# Patient Record
Sex: FEMALE | Race: WHITE | NOT HISPANIC OR LATINO | Employment: FULL TIME | URBAN - METROPOLITAN AREA
[De-identification: names, ages, dates, MRNs, and addresses within clinical notes are randomized per-mention and may not be internally consistent; named-entity substitution may affect disease eponyms.]

---

## 2017-03-28 ENCOUNTER — COMMUNICATION - HEALTHEAST (OUTPATIENT)
Dept: INTERNAL MEDICINE | Facility: CLINIC | Age: 27
End: 2017-03-28

## 2017-05-17 ENCOUNTER — HOSPITAL ENCOUNTER (OUTPATIENT)
Dept: LAB | Age: 27
Setting detail: SPECIMEN
Discharge: HOME | End: 2017-05-17

## 2017-05-17 ENCOUNTER — OFFICE VISIT - HEALTHEAST (OUTPATIENT)
Dept: FAMILY MEDICINE | Facility: CLINIC | Age: 27
End: 2017-05-17

## 2017-05-17 DIAGNOSIS — R07.0 THROAT PAIN: ICD-10-CM

## 2017-06-02 ENCOUNTER — OFFICE VISIT - HEALTHEAST (OUTPATIENT)
Dept: INTERNAL MEDICINE | Facility: CLINIC | Age: 27
End: 2017-06-02

## 2017-06-02 DIAGNOSIS — E66.811 OBESITY (BMI 30.0-34.9): ICD-10-CM

## 2017-06-02 DIAGNOSIS — Z76.89 ENCOUNTER TO ESTABLISH CARE: ICD-10-CM

## 2017-06-02 DIAGNOSIS — F32.A DEPRESSION: ICD-10-CM

## 2017-06-02 ASSESSMENT — MIFFLIN-ST. JEOR: SCORE: 1590.18

## 2017-07-25 ENCOUNTER — COMMUNICATION - HEALTHEAST (OUTPATIENT)
Dept: INTERNAL MEDICINE | Facility: CLINIC | Age: 27
End: 2017-07-25

## 2017-07-25 ENCOUNTER — OFFICE VISIT - HEALTHEAST (OUTPATIENT)
Dept: INTERNAL MEDICINE | Facility: CLINIC | Age: 27
End: 2017-07-25

## 2017-07-25 DIAGNOSIS — F32.A DEPRESSION: ICD-10-CM

## 2017-07-25 DIAGNOSIS — F41.1 GAD (GENERALIZED ANXIETY DISORDER): ICD-10-CM

## 2017-07-25 DIAGNOSIS — Z09 FOLLOW UP: ICD-10-CM

## 2017-08-29 ENCOUNTER — COMMUNICATION - HEALTHEAST (OUTPATIENT)
Dept: INTERNAL MEDICINE | Facility: CLINIC | Age: 27
End: 2017-08-29

## 2017-08-29 DIAGNOSIS — F32.A DEPRESSION: ICD-10-CM

## 2018-01-23 ENCOUNTER — OFFICE VISIT - HEALTHEAST (OUTPATIENT)
Dept: FAMILY MEDICINE | Facility: CLINIC | Age: 28
End: 2018-01-23

## 2018-01-23 DIAGNOSIS — R51.9 HEADACHE: ICD-10-CM

## 2018-01-23 DIAGNOSIS — N92.6 MENSTRUAL PERIODS IRREGULAR: ICD-10-CM

## 2018-01-23 LAB
HCG UR QL: NEGATIVE
SP GR UR STRIP: 1.02 (ref 1–1.03)

## 2018-03-05 ENCOUNTER — OFFICE VISIT - HEALTHEAST (OUTPATIENT)
Dept: INTERNAL MEDICINE | Facility: CLINIC | Age: 28
End: 2018-03-05

## 2018-03-05 DIAGNOSIS — Z11.3 ROUTINE SCREENING FOR STI (SEXUALLY TRANSMITTED INFECTION): ICD-10-CM

## 2018-03-05 DIAGNOSIS — N89.8 VAGINAL DISCHARGE: ICD-10-CM

## 2018-03-05 LAB
CLUE CELLS: ABNORMAL
TRICHOMONAS, WET PREP: ABNORMAL
YEAST, WET PREP: ABNORMAL

## 2018-03-06 LAB
C TRACH DNA SPEC QL PROBE+SIG AMP: NEGATIVE
N GONORRHOEA DNA SPEC QL NAA+PROBE: NEGATIVE
SYPHILIS RPR SCREEN - HISTORICAL: NORMAL

## 2018-05-17 ENCOUNTER — COMMUNICATION - HEALTHEAST (OUTPATIENT)
Dept: FAMILY MEDICINE | Facility: CLINIC | Age: 28
End: 2018-05-17

## 2018-05-22 ENCOUNTER — COMMUNICATION - HEALTHEAST (OUTPATIENT)
Dept: SCHEDULING | Facility: CLINIC | Age: 28
End: 2018-05-22

## 2018-05-23 ENCOUNTER — OFFICE VISIT - HEALTHEAST (OUTPATIENT)
Dept: INTERNAL MEDICINE | Facility: CLINIC | Age: 28
End: 2018-05-23

## 2018-05-23 DIAGNOSIS — L01.00 IMPETIGO: ICD-10-CM

## 2018-06-07 ENCOUNTER — COMMUNICATION - HEALTHEAST (OUTPATIENT)
Dept: SCHEDULING | Facility: CLINIC | Age: 28
End: 2018-06-07

## 2018-06-07 DIAGNOSIS — R21 FACIAL RASH: ICD-10-CM

## 2018-06-11 ENCOUNTER — OFFICE VISIT - HEALTHEAST (OUTPATIENT)
Dept: INTERNAL MEDICINE | Facility: CLINIC | Age: 28
End: 2018-06-11

## 2018-06-11 DIAGNOSIS — F41.1 GAD (GENERALIZED ANXIETY DISORDER): ICD-10-CM

## 2018-06-11 DIAGNOSIS — Z91.030 HISTORY OF BEE STING ALLERGY: ICD-10-CM

## 2018-06-11 DIAGNOSIS — L01.00 IMPETIGO: ICD-10-CM

## 2018-06-19 ENCOUNTER — COMMUNICATION - HEALTHEAST (OUTPATIENT)
Dept: INTERNAL MEDICINE | Facility: CLINIC | Age: 28
End: 2018-06-19

## 2018-09-04 ENCOUNTER — AMBULATORY - HEALTHEAST (OUTPATIENT)
Dept: INTERNAL MEDICINE | Facility: CLINIC | Age: 28
End: 2018-09-04

## 2018-09-04 ENCOUNTER — COMMUNICATION - HEALTHEAST (OUTPATIENT)
Dept: INTERNAL MEDICINE | Facility: CLINIC | Age: 28
End: 2018-09-04

## 2018-09-04 DIAGNOSIS — F41.1 GAD (GENERALIZED ANXIETY DISORDER): ICD-10-CM

## 2018-09-04 DIAGNOSIS — F32.A DEPRESSION: ICD-10-CM

## 2018-09-11 ENCOUNTER — OFFICE VISIT - HEALTHEAST (OUTPATIENT)
Dept: BEHAVIORAL HEALTH | Facility: HOSPITAL | Age: 28
End: 2018-09-11

## 2018-09-11 DIAGNOSIS — F41.1 GAD (GENERALIZED ANXIETY DISORDER): ICD-10-CM

## 2018-09-11 DIAGNOSIS — F33.1 MAJOR DEPRESSIVE DISORDER, RECURRENT EPISODE, MODERATE (H): ICD-10-CM

## 2018-10-15 ENCOUNTER — OFFICE VISIT - HEALTHEAST (OUTPATIENT)
Dept: BEHAVIORAL HEALTH | Facility: CLINIC | Age: 28
End: 2018-10-15

## 2018-10-15 DIAGNOSIS — F33.41 RECURRENT MAJOR DEPRESSIVE DISORDER, IN PARTIAL REMISSION (H): ICD-10-CM

## 2018-10-15 DIAGNOSIS — F33.41 MAJOR DEPRESSIVE DISORDER, RECURRENT, IN PARTIAL REMISSION (H): ICD-10-CM

## 2018-10-15 DIAGNOSIS — F41.1 GAD (GENERALIZED ANXIETY DISORDER): ICD-10-CM

## 2018-10-15 ASSESSMENT — MIFFLIN-ST. JEOR: SCORE: 1581.11

## 2019-01-03 ENCOUNTER — COMMUNICATION - HEALTHEAST (OUTPATIENT)
Dept: BEHAVIORAL HEALTH | Facility: CLINIC | Age: 29
End: 2019-01-03

## 2019-01-03 DIAGNOSIS — F41.1 GAD (GENERALIZED ANXIETY DISORDER): ICD-10-CM

## 2019-01-03 DIAGNOSIS — F33.41 MAJOR DEPRESSIVE DISORDER, RECURRENT, IN PARTIAL REMISSION (H): ICD-10-CM

## 2019-01-06 ENCOUNTER — COMMUNICATION - HEALTHEAST (OUTPATIENT)
Dept: BEHAVIORAL HEALTH | Facility: CLINIC | Age: 29
End: 2019-01-06

## 2019-01-06 DIAGNOSIS — F41.1 GAD (GENERALIZED ANXIETY DISORDER): ICD-10-CM

## 2019-01-06 DIAGNOSIS — F33.41 RECURRENT MAJOR DEPRESSIVE DISORDER, IN PARTIAL REMISSION (H): ICD-10-CM

## 2019-02-01 ENCOUNTER — COMMUNICATION - HEALTHEAST (OUTPATIENT)
Dept: BEHAVIORAL HEALTH | Facility: CLINIC | Age: 29
End: 2019-02-01

## 2019-03-04 ENCOUNTER — OFFICE VISIT - HEALTHEAST (OUTPATIENT)
Dept: BEHAVIORAL HEALTH | Facility: CLINIC | Age: 29
End: 2019-03-04

## 2019-03-04 DIAGNOSIS — F41.1 GAD (GENERALIZED ANXIETY DISORDER): ICD-10-CM

## 2019-03-04 DIAGNOSIS — F33.41 MAJOR DEPRESSIVE DISORDER, RECURRENT, IN PARTIAL REMISSION (H): ICD-10-CM

## 2019-03-04 ASSESSMENT — MIFFLIN-ST. JEOR: SCORE: 1553.89

## 2019-03-05 ENCOUNTER — RECORDS - HEALTHEAST (OUTPATIENT)
Dept: ADMINISTRATIVE | Facility: OTHER | Age: 29
End: 2019-03-05

## 2019-03-05 LAB
HPV_EXT - HISTORICAL: ABNORMAL
PAP SMEAR - HIM PATIENT REPORTED: ABNORMAL

## 2019-03-11 ENCOUNTER — COMMUNICATION - HEALTHEAST (OUTPATIENT)
Dept: BEHAVIORAL HEALTH | Facility: CLINIC | Age: 29
End: 2019-03-11

## 2019-03-11 DIAGNOSIS — F33.41 MAJOR DEPRESSIVE DISORDER, RECURRENT, IN PARTIAL REMISSION (H): ICD-10-CM

## 2019-03-19 ENCOUNTER — OFFICE VISIT - HEALTHEAST (OUTPATIENT)
Dept: FAMILY MEDICINE | Facility: CLINIC | Age: 29
End: 2019-03-19

## 2019-03-19 ENCOUNTER — COMMUNICATION - HEALTHEAST (OUTPATIENT)
Dept: SCHEDULING | Facility: CLINIC | Age: 29
End: 2019-03-19

## 2019-03-19 DIAGNOSIS — T78.40XA ALLERGIC REACTION, INITIAL ENCOUNTER: ICD-10-CM

## 2019-03-20 ENCOUNTER — COMMUNICATION - HEALTHEAST (OUTPATIENT)
Dept: BEHAVIORAL HEALTH | Facility: CLINIC | Age: 29
End: 2019-03-20

## 2019-03-20 DIAGNOSIS — F33.41 MAJOR DEPRESSIVE DISORDER, RECURRENT, IN PARTIAL REMISSION (H): ICD-10-CM

## 2019-03-23 ENCOUNTER — COMMUNICATION - HEALTHEAST (OUTPATIENT)
Dept: SCHEDULING | Facility: CLINIC | Age: 29
End: 2019-03-23

## 2019-04-29 ENCOUNTER — OFFICE VISIT - HEALTHEAST (OUTPATIENT)
Dept: BEHAVIORAL HEALTH | Facility: CLINIC | Age: 29
End: 2019-04-29

## 2019-04-29 ENCOUNTER — RECORDS - HEALTHEAST (OUTPATIENT)
Dept: ADMINISTRATIVE | Facility: OTHER | Age: 29
End: 2019-04-29

## 2019-04-29 DIAGNOSIS — F41.1 GAD (GENERALIZED ANXIETY DISORDER): ICD-10-CM

## 2019-04-29 DIAGNOSIS — F33.41 MAJOR DEPRESSIVE DISORDER, RECURRENT, IN PARTIAL REMISSION (H): ICD-10-CM

## 2019-04-29 ASSESSMENT — MIFFLIN-ST. JEOR: SCORE: 1544.82

## 2019-04-30 ENCOUNTER — AMBULATORY - HEALTHEAST (OUTPATIENT)
Dept: MIDWIFE SERVICES | Facility: CLINIC | Age: 29
End: 2019-04-30

## 2019-04-30 ENCOUNTER — COMMUNICATION - HEALTHEAST (OUTPATIENT)
Dept: ADMINISTRATIVE | Facility: CLINIC | Age: 29
End: 2019-04-30

## 2019-04-30 DIAGNOSIS — Z01.812 PRE-PROCEDURE LAB EXAM: ICD-10-CM

## 2019-05-10 ENCOUNTER — RECORDS - HEALTHEAST (OUTPATIENT)
Dept: HEALTH INFORMATION MANAGEMENT | Facility: CLINIC | Age: 29
End: 2019-05-10

## 2019-05-14 ENCOUNTER — AMBULATORY - HEALTHEAST (OUTPATIENT)
Dept: OBGYN | Facility: CLINIC | Age: 29
End: 2019-05-14

## 2019-05-14 ENCOUNTER — AMBULATORY - HEALTHEAST (OUTPATIENT)
Dept: LAB | Facility: CLINIC | Age: 29
End: 2019-05-14

## 2019-05-14 DIAGNOSIS — R87.610 ASCUS WITH POSITIVE HIGH RISK HPV CERVICAL: ICD-10-CM

## 2019-05-14 DIAGNOSIS — Z01.812 PRE-PROCEDURE LAB EXAM: ICD-10-CM

## 2019-05-14 DIAGNOSIS — R87.810 ASCUS WITH POSITIVE HIGH RISK HPV CERVICAL: ICD-10-CM

## 2019-05-14 LAB — HCG UR QL: NEGATIVE

## 2019-05-14 ASSESSMENT — MIFFLIN-ST. JEOR: SCORE: 1562.96

## 2019-05-28 ENCOUNTER — RECORDS - HEALTHEAST (OUTPATIENT)
Dept: ADMINISTRATIVE | Facility: OTHER | Age: 29
End: 2019-05-28

## 2019-06-27 ENCOUNTER — RECORDS - HEALTHEAST (OUTPATIENT)
Dept: ADMINISTRATIVE | Facility: OTHER | Age: 29
End: 2019-06-27

## 2019-07-08 ENCOUNTER — OFFICE VISIT - HEALTHEAST (OUTPATIENT)
Dept: BEHAVIORAL HEALTH | Facility: CLINIC | Age: 29
End: 2019-07-08

## 2019-07-08 DIAGNOSIS — F33.41 MAJOR DEPRESSIVE DISORDER, RECURRENT, IN PARTIAL REMISSION (H): ICD-10-CM

## 2019-07-08 DIAGNOSIS — F41.1 GAD (GENERALIZED ANXIETY DISORDER): ICD-10-CM

## 2019-07-08 ASSESSMENT — MIFFLIN-ST. JEOR: SCORE: 1585.64

## 2019-08-05 ENCOUNTER — RECORDS - HEALTHEAST (OUTPATIENT)
Dept: ADMINISTRATIVE | Facility: OTHER | Age: 29
End: 2019-08-05

## 2019-09-16 ENCOUNTER — RECORDS - HEALTHEAST (OUTPATIENT)
Dept: ADMINISTRATIVE | Facility: OTHER | Age: 29
End: 2019-09-16

## 2019-10-11 ENCOUNTER — COMMUNICATION - HEALTHEAST (OUTPATIENT)
Dept: BEHAVIORAL HEALTH | Facility: CLINIC | Age: 29
End: 2019-10-11

## 2019-10-14 ENCOUNTER — COMMUNICATION - HEALTHEAST (OUTPATIENT)
Dept: BEHAVIORAL HEALTH | Facility: CLINIC | Age: 29
End: 2019-10-14

## 2019-11-25 ENCOUNTER — OFFICE VISIT - HEALTHEAST (OUTPATIENT)
Dept: BEHAVIORAL HEALTH | Facility: CLINIC | Age: 29
End: 2019-11-25

## 2019-11-25 DIAGNOSIS — F33.41 MAJOR DEPRESSIVE DISORDER, RECURRENT, IN PARTIAL REMISSION (H): ICD-10-CM

## 2019-11-25 DIAGNOSIS — F41.1 GAD (GENERALIZED ANXIETY DISORDER): ICD-10-CM

## 2019-11-25 ASSESSMENT — ANXIETY QUESTIONNAIRES
5. BEING SO RESTLESS THAT IT IS HARD TO SIT STILL: SEVERAL DAYS
6. BECOMING EASILY ANNOYED OR IRRITABLE: SEVERAL DAYS
4. TROUBLE RELAXING: SEVERAL DAYS
7. FEELING AFRAID AS IF SOMETHING AWFUL MIGHT HAPPEN: SEVERAL DAYS
2. NOT BEING ABLE TO STOP OR CONTROL WORRYING: SEVERAL DAYS
1. FEELING NERVOUS, ANXIOUS, OR ON EDGE: MORE THAN HALF THE DAYS
3. WORRYING TOO MUCH ABOUT DIFFERENT THINGS: MORE THAN HALF THE DAYS
GAD7 TOTAL SCORE: 9

## 2019-11-25 ASSESSMENT — MIFFLIN-ST. JEOR: SCORE: 1672.97

## 2019-11-25 ASSESSMENT — PATIENT HEALTH QUESTIONNAIRE - PHQ9: SUM OF ALL RESPONSES TO PHQ QUESTIONS 1-9: 9

## 2019-12-09 ENCOUNTER — OFFICE VISIT - HEALTHEAST (OUTPATIENT)
Dept: MIDWIFE SERVICES | Facility: CLINIC | Age: 29
End: 2019-12-09

## 2019-12-09 ENCOUNTER — COMMUNICATION - HEALTHEAST (OUTPATIENT)
Dept: MIDWIFE SERVICES | Facility: CLINIC | Age: 29
End: 2019-12-09

## 2019-12-09 DIAGNOSIS — R39.15 URINARY URGENCY: ICD-10-CM

## 2019-12-09 DIAGNOSIS — N90.89 VULVAR LESION: ICD-10-CM

## 2019-12-09 LAB
ALBUMIN UR-MCNC: NEGATIVE MG/DL
APPEARANCE UR: CLEAR
BACTERIA #/AREA URNS HPF: ABNORMAL HPF
BILIRUB UR QL STRIP: NEGATIVE
CLUE CELLS: NORMAL
COLOR UR AUTO: YELLOW
GLUCOSE UR STRIP-MCNC: NEGATIVE MG/DL
HGB UR QL STRIP: ABNORMAL
KETONES UR STRIP-MCNC: ABNORMAL MG/DL
LEUKOCYTE ESTERASE UR QL STRIP: ABNORMAL
MUCOUS THREADS #/AREA URNS LPF: ABNORMAL LPF
NITRATE UR QL: NEGATIVE
PH UR STRIP: 6 [PH] (ref 5–8)
RBC #/AREA URNS AUTO: ABNORMAL HPF
SP GR UR STRIP: 1.02 (ref 1–1.03)
SQUAMOUS #/AREA URNS AUTO: ABNORMAL LPF
TRICHOMONAS, WET PREP: NORMAL
UROBILINOGEN UR STRIP-ACNC: ABNORMAL
WBC #/AREA URNS AUTO: ABNORMAL HPF
YEAST, WET PREP: NORMAL

## 2019-12-09 ASSESSMENT — MIFFLIN-ST. JEOR: SCORE: 1686.58

## 2019-12-10 ENCOUNTER — AMBULATORY - HEALTHEAST (OUTPATIENT)
Dept: MIDWIFE SERVICES | Facility: CLINIC | Age: 29
End: 2019-12-10

## 2019-12-10 DIAGNOSIS — A56.09 CHLAMYDIA TRACHOMATIS INFECTION OF LOWER GENITOURINARY SITES: ICD-10-CM

## 2019-12-10 LAB
C TRACH DNA SPEC QL PROBE+SIG AMP: POSITIVE
HSV SPECIMEN: NORMAL
HSV1 DNA SPEC QL NAA+PROBE: NEGATIVE
HSV2 DNA SPEC QL NAA+PROBE: NEGATIVE
N GONORRHOEA DNA SPEC QL NAA+PROBE: NEGATIVE

## 2019-12-11 LAB — BACTERIA SPEC CULT: NORMAL

## 2019-12-22 ENCOUNTER — COMMUNICATION - HEALTHEAST (OUTPATIENT)
Dept: LAB | Facility: CLINIC | Age: 29
End: 2019-12-22

## 2019-12-26 ENCOUNTER — COMMUNICATION - HEALTHEAST (OUTPATIENT)
Dept: LAB | Facility: CLINIC | Age: 29
End: 2019-12-26

## 2019-12-27 ENCOUNTER — AMBULATORY - HEALTHEAST (OUTPATIENT)
Dept: MIDWIFE SERVICES | Facility: CLINIC | Age: 29
End: 2019-12-27

## 2019-12-27 ENCOUNTER — AMBULATORY - HEALTHEAST (OUTPATIENT)
Dept: LAB | Facility: CLINIC | Age: 29
End: 2019-12-27

## 2019-12-27 DIAGNOSIS — A56.09 CHLAMYDIA TRACHOMATIS INFECTION OF LOWER GENITOURINARY SITES: ICD-10-CM

## 2019-12-30 LAB
C TRACH DNA SPEC QL PROBE+SIG AMP: NEGATIVE
N GONORRHOEA DNA SPEC QL NAA+PROBE: NEGATIVE

## 2020-01-14 ENCOUNTER — RECORDS - HEALTHEAST (OUTPATIENT)
Dept: ADMINISTRATIVE | Facility: OTHER | Age: 30
End: 2020-01-14

## 2020-01-20 ENCOUNTER — OFFICE VISIT - HEALTHEAST (OUTPATIENT)
Dept: FAMILY MEDICINE | Facility: CLINIC | Age: 30
End: 2020-01-20

## 2020-01-20 DIAGNOSIS — Z20.2 POSSIBLE EXPOSURE TO STD: ICD-10-CM

## 2020-01-20 DIAGNOSIS — R07.0 THROAT PAIN: ICD-10-CM

## 2020-01-20 LAB — DEPRECATED S PYO AG THROAT QL EIA: NORMAL

## 2020-01-20 ASSESSMENT — MIFFLIN-ST. JEOR: SCORE: 1659.93

## 2020-01-21 ENCOUNTER — COMMUNICATION - HEALTHEAST (OUTPATIENT)
Dept: FAMILY MEDICINE | Facility: CLINIC | Age: 30
End: 2020-01-21

## 2020-01-21 DIAGNOSIS — J02.0 PHARYNGITIS DUE TO STREPTOCOCCUS SPECIES: ICD-10-CM

## 2020-01-21 LAB
BACTERIA SPEC CULT: ABNORMAL
BACTERIA SPEC CULT: ABNORMAL
C TRACH DNA SPEC QL NAA+PROBE: NEGATIVE
SPECIMEN DESCRIPTION: NORMAL

## 2020-01-23 ENCOUNTER — RECORDS - HEALTHEAST (OUTPATIENT)
Dept: ADMINISTRATIVE | Facility: OTHER | Age: 30
End: 2020-01-23

## 2020-01-23 LAB — BACTERIA SPEC CULT: NORMAL

## 2020-03-16 ENCOUNTER — OFFICE VISIT - HEALTHEAST (OUTPATIENT)
Dept: BEHAVIORAL HEALTH | Facility: CLINIC | Age: 30
End: 2020-03-16

## 2020-03-16 DIAGNOSIS — F41.1 GAD (GENERALIZED ANXIETY DISORDER): ICD-10-CM

## 2020-03-16 DIAGNOSIS — F33.41 MAJOR DEPRESSIVE DISORDER, RECURRENT, IN PARTIAL REMISSION (H): ICD-10-CM

## 2020-03-16 ASSESSMENT — ANXIETY QUESTIONNAIRES
3. WORRYING TOO MUCH ABOUT DIFFERENT THINGS: NEARLY EVERY DAY
2. NOT BEING ABLE TO STOP OR CONTROL WORRYING: NEARLY EVERY DAY
7. FEELING AFRAID AS IF SOMETHING AWFUL MIGHT HAPPEN: NEARLY EVERY DAY
5. BEING SO RESTLESS THAT IT IS HARD TO SIT STILL: MORE THAN HALF THE DAYS
4. TROUBLE RELAXING: MORE THAN HALF THE DAYS
1. FEELING NERVOUS, ANXIOUS, OR ON EDGE: NEARLY EVERY DAY
6. BECOMING EASILY ANNOYED OR IRRITABLE: NEARLY EVERY DAY
GAD7 TOTAL SCORE: 19

## 2020-03-16 ASSESSMENT — PATIENT HEALTH QUESTIONNAIRE - PHQ9: SUM OF ALL RESPONSES TO PHQ QUESTIONS 1-9: 10

## 2020-03-16 ASSESSMENT — MIFFLIN-ST. JEOR: SCORE: 1682.04

## 2020-04-27 ENCOUNTER — COMMUNICATION - HEALTHEAST (OUTPATIENT)
Dept: INTERNAL MEDICINE | Facility: CLINIC | Age: 30
End: 2020-04-27

## 2020-04-27 DIAGNOSIS — Z91.030 HISTORY OF BEE STING ALLERGY: ICD-10-CM

## 2020-07-16 ENCOUNTER — OFFICE VISIT - HEALTHEAST (OUTPATIENT)
Dept: INTERNAL MEDICINE | Facility: CLINIC | Age: 30
End: 2020-07-16

## 2020-07-16 DIAGNOSIS — R19.7 DIARRHEA, UNSPECIFIED TYPE: ICD-10-CM

## 2020-07-16 LAB
BASOPHILS # BLD AUTO: 0.1 THOU/UL (ref 0–0.2)
BASOPHILS NFR BLD AUTO: 1 % (ref 0–2)
C REACTIVE PROTEIN LHE: 1.4 MG/DL (ref 0–0.8)
EOSINOPHIL # BLD AUTO: 0.1 THOU/UL (ref 0–0.4)
EOSINOPHIL NFR BLD AUTO: 2 % (ref 0–6)
ERYTHROCYTE [DISTWIDTH] IN BLOOD BY AUTOMATED COUNT: 13 % (ref 11–14.5)
ERYTHROCYTE [SEDIMENTATION RATE] IN BLOOD BY WESTERGREN METHOD: 12 MM/HR (ref 0–20)
HCT VFR BLD AUTO: 38.9 % (ref 35–47)
HGB BLD-MCNC: 12.9 G/DL (ref 12–16)
LYMPHOCYTES # BLD AUTO: 2.1 THOU/UL (ref 0.8–4.4)
LYMPHOCYTES NFR BLD AUTO: 33 % (ref 20–40)
MCH RBC QN AUTO: 28.9 PG (ref 27–34)
MCHC RBC AUTO-ENTMCNC: 33.2 G/DL (ref 32–36)
MCV RBC AUTO: 87 FL (ref 80–100)
MONOCYTES # BLD AUTO: 0.4 THOU/UL (ref 0–0.9)
MONOCYTES NFR BLD AUTO: 6 % (ref 2–10)
NEUTROPHILS # BLD AUTO: 3.8 THOU/UL (ref 2–7.7)
NEUTROPHILS NFR BLD AUTO: 58 % (ref 50–70)
PLATELET # BLD AUTO: 324 THOU/UL (ref 140–440)
PMV BLD AUTO: 11.3 FL (ref 8.5–12.5)
RBC # BLD AUTO: 4.47 MILL/UL (ref 3.8–5.4)
TSH SERPL DL<=0.005 MIU/L-ACNC: 1.1 UIU/ML (ref 0.3–5)
WBC: 6.5 THOU/UL (ref 4–11)

## 2020-07-16 ASSESSMENT — MIFFLIN-ST. JEOR: SCORE: 1682.04

## 2020-07-21 ENCOUNTER — AMBULATORY - HEALTHEAST (OUTPATIENT)
Dept: INTERNAL MEDICINE | Facility: CLINIC | Age: 30
End: 2020-07-21

## 2020-07-21 ENCOUNTER — COMMUNICATION - HEALTHEAST (OUTPATIENT)
Dept: INTERNAL MEDICINE | Facility: CLINIC | Age: 30
End: 2020-07-21

## 2020-07-21 DIAGNOSIS — R19.7 DIARRHEA, UNSPECIFIED TYPE: ICD-10-CM

## 2020-07-21 LAB
GLIADIN IGA SER-ACNC: 1.1 U/ML
GLIADIN IGG SER-ACNC: <0.4 U/ML
IGA SERPL-MCNC: 191 MG/DL (ref 65–400)
TTG IGA SER-ACNC: 0.4 U/ML
TTG IGG SER-ACNC: <0.6 U/ML

## 2020-08-03 ENCOUNTER — RECORDS - HEALTHEAST (OUTPATIENT)
Dept: ADMINISTRATIVE | Facility: OTHER | Age: 30
End: 2020-08-03

## 2020-08-17 ENCOUNTER — COMMUNICATION - HEALTHEAST (OUTPATIENT)
Dept: ADMINISTRATIVE | Facility: CLINIC | Age: 30
End: 2020-08-17

## 2020-08-17 DIAGNOSIS — Z01.812 PRE-PROCEDURE LAB EXAM: ICD-10-CM

## 2020-08-18 ENCOUNTER — COMMUNICATION - HEALTHEAST (OUTPATIENT)
Dept: ADMINISTRATIVE | Facility: CLINIC | Age: 30
End: 2020-08-18

## 2020-08-25 ENCOUNTER — AMBULATORY - HEALTHEAST (OUTPATIENT)
Dept: OBGYN | Facility: CLINIC | Age: 30
End: 2020-08-25

## 2020-08-25 ENCOUNTER — AMBULATORY - HEALTHEAST (OUTPATIENT)
Dept: LAB | Facility: CLINIC | Age: 30
End: 2020-08-25

## 2020-08-25 DIAGNOSIS — N90.89 VULVAR SKIN TAG: ICD-10-CM

## 2020-08-25 DIAGNOSIS — Z01.812 PRE-PROCEDURE LAB EXAM: ICD-10-CM

## 2020-08-25 DIAGNOSIS — Z11.3 ROUTINE SCREENING FOR STI (SEXUALLY TRANSMITTED INFECTION): ICD-10-CM

## 2020-08-25 DIAGNOSIS — R87.610 ASCUS OF CERVIX WITH NEGATIVE HIGH RISK HPV: ICD-10-CM

## 2020-08-25 LAB — HCG UR QL: NEGATIVE

## 2020-08-26 LAB
C TRACH DNA SPEC QL PROBE+SIG AMP: NEGATIVE
N GONORRHOEA DNA SPEC QL NAA+PROBE: NEGATIVE

## 2020-08-27 ENCOUNTER — OFFICE VISIT - HEALTHEAST (OUTPATIENT)
Dept: INTERNAL MEDICINE | Facility: CLINIC | Age: 30
End: 2020-08-27

## 2020-09-11 ENCOUNTER — COMMUNICATION - HEALTHEAST (OUTPATIENT)
Dept: BEHAVIORAL HEALTH | Facility: CLINIC | Age: 30
End: 2020-09-11

## 2020-09-17 ENCOUNTER — COMMUNICATION - HEALTHEAST (OUTPATIENT)
Dept: INTERNAL MEDICINE | Facility: CLINIC | Age: 30
End: 2020-09-17

## 2020-09-17 DIAGNOSIS — B37.31 YEAST VAGINITIS: ICD-10-CM

## 2020-09-22 ENCOUNTER — AMBULATORY - HEALTHEAST (OUTPATIENT)
Dept: LAB | Facility: CLINIC | Age: 30
End: 2020-09-22

## 2020-09-22 DIAGNOSIS — R19.7 DIARRHEA, UNSPECIFIED TYPE: ICD-10-CM

## 2020-09-23 LAB — CALPROTECTIN STL-MCNT: 9.6 MG/KG (ref 0–49.9)

## 2020-09-28 ENCOUNTER — OFFICE VISIT - HEALTHEAST (OUTPATIENT)
Dept: INTERNAL MEDICINE | Facility: CLINIC | Age: 30
End: 2020-09-28

## 2020-10-14 ENCOUNTER — COMMUNICATION - HEALTHEAST (OUTPATIENT)
Dept: INTERNAL MEDICINE | Facility: CLINIC | Age: 30
End: 2020-10-14

## 2020-10-14 DIAGNOSIS — Z30.8 ENCOUNTER FOR OTHER CONTRACEPTIVE MANAGEMENT: ICD-10-CM

## 2020-12-22 ENCOUNTER — OFFICE VISIT - HEALTHEAST (OUTPATIENT)
Dept: FAMILY MEDICINE | Facility: CLINIC | Age: 30
End: 2020-12-22

## 2020-12-22 ENCOUNTER — AMBULATORY - HEALTHEAST (OUTPATIENT)
Dept: FAMILY MEDICINE | Facility: CLINIC | Age: 30
End: 2020-12-22

## 2020-12-22 DIAGNOSIS — R05.9 COUGH: ICD-10-CM

## 2020-12-22 DIAGNOSIS — Z13.9 SCREENING FOR CONDITION: ICD-10-CM

## 2020-12-22 DIAGNOSIS — Z20.822 SUSPECTED COVID-19 VIRUS INFECTION: ICD-10-CM

## 2020-12-23 ENCOUNTER — COMMUNICATION - HEALTHEAST (OUTPATIENT)
Dept: SCHEDULING | Facility: CLINIC | Age: 30
End: 2020-12-23

## 2020-12-23 LAB
SARS-COV-2 PCR COMMENT: NORMAL
SARS-COV-2 RNA SPEC QL NAA+PROBE: NEGATIVE
SARS-COV-2 VIRUS SPECIMEN SOURCE: NORMAL

## 2021-02-26 ENCOUNTER — COMMUNICATION - HEALTHEAST (OUTPATIENT)
Dept: INTERNAL MEDICINE | Facility: CLINIC | Age: 31
End: 2021-02-26

## 2021-02-26 DIAGNOSIS — F41.1 GAD (GENERALIZED ANXIETY DISORDER): ICD-10-CM

## 2021-02-26 DIAGNOSIS — F33.41 MAJOR DEPRESSIVE DISORDER, RECURRENT, IN PARTIAL REMISSION (H): ICD-10-CM

## 2021-03-03 ENCOUNTER — COMMUNICATION - HEALTHEAST (OUTPATIENT)
Dept: BEHAVIORAL HEALTH | Facility: CLINIC | Age: 31
End: 2021-03-03

## 2021-03-10 ENCOUNTER — OFFICE VISIT - HEALTHEAST (OUTPATIENT)
Dept: INTERNAL MEDICINE | Facility: CLINIC | Age: 31
End: 2021-03-10

## 2021-03-10 DIAGNOSIS — G43.109 MIGRAINE WITH AURA AND WITHOUT STATUS MIGRAINOSUS, NOT INTRACTABLE: ICD-10-CM

## 2021-03-10 DIAGNOSIS — D22.9 CHANGING NEVUS: ICD-10-CM

## 2021-03-10 NOTE — ASSESSMENT & PLAN NOTE
- START: Imitrex as needed  - Counseled that an estrogen-free contraception option is preferred in the setting of migraine with aura. She will consider options and send a Simple message   - Follow up annually if migraine frequency remains the same as it is currently

## 2021-03-29 ENCOUNTER — OFFICE VISIT - HEALTHEAST (OUTPATIENT)
Dept: INTERNAL MEDICINE | Facility: CLINIC | Age: 31
End: 2021-03-29

## 2021-03-29 DIAGNOSIS — Z91.030 HISTORY OF BEE STING ALLERGY: ICD-10-CM

## 2021-03-29 DIAGNOSIS — Z30.430 ENCOUNTER FOR INSERTION OF INTRAUTERINE CONTRACEPTIVE DEVICE: ICD-10-CM

## 2021-03-29 LAB — HCG UR QL: NEGATIVE

## 2021-05-13 ENCOUNTER — OFFICE VISIT - HEALTHEAST (OUTPATIENT)
Dept: INTERNAL MEDICINE | Facility: CLINIC | Age: 31
End: 2021-05-13

## 2021-05-13 DIAGNOSIS — Z11.3 ROUTINE SCREENING FOR STI (SEXUALLY TRANSMITTED INFECTION): ICD-10-CM

## 2021-05-13 DIAGNOSIS — F33.41 MAJOR DEPRESSIVE DISORDER, RECURRENT, IN PARTIAL REMISSION (H): ICD-10-CM

## 2021-05-13 DIAGNOSIS — Z30.431 IUD CHECK UP: ICD-10-CM

## 2021-05-13 DIAGNOSIS — Z12.4 CERVICAL CANCER SCREENING: ICD-10-CM

## 2021-05-13 NOTE — ASSESSMENT & PLAN NOTE
Reviewed options including referral to bariatric clinic, [employer], Noom application, visit to dietician. She will look into the[facility]etician at [facility] where her insurance indicated coverage  - START: wellbutrin 150 mg two times a day and naltrexone 50 mg daily  - Follow up in 2 months for weight check, LFTs

## 2021-05-13 NOTE — ASSESSMENT & PLAN NOTE
Doing well from a mood standpoint  Bupropion may have some benefit for mood as well as weight loss, will repeat PHQ9 at next visit

## 2021-05-14 LAB
C TRACH DNA SPEC QL PROBE+SIG AMP: NEGATIVE
HPV SOURCE: NORMAL
HUMAN PAPILLOMA VIRUS 16 DNA: NEGATIVE
HUMAN PAPILLOMA VIRUS 18 DNA: NEGATIVE
HUMAN PAPILLOMA VIRUS FINAL DIAGNOSIS: NORMAL
HUMAN PAPILLOMA VIRUS OTHER HR: NEGATIVE
N GONORRHOEA DNA SPEC QL NAA+PROBE: NEGATIVE
SPECIMEN DESCRIPTION: NORMAL

## 2021-05-17 ENCOUNTER — COMMUNICATION - HEALTHEAST (OUTPATIENT)
Dept: INTERNAL MEDICINE | Facility: CLINIC | Age: 31
End: 2021-05-17

## 2021-05-17 DIAGNOSIS — Z91.030 HISTORY OF BEE STING ALLERGY: ICD-10-CM

## 2021-05-20 ENCOUNTER — COMMUNICATION - HEALTHEAST (OUTPATIENT)
Dept: OBGYN | Facility: CLINIC | Age: 31
End: 2021-05-20

## 2021-05-26 ASSESSMENT — PATIENT HEALTH QUESTIONNAIRE - PHQ9: SUM OF ALL RESPONSES TO PHQ QUESTIONS 1-9: 9

## 2021-05-26 NOTE — TELEPHONE ENCOUNTER
Call from pt       CC: nausea for past few days and episode x 1 of emesis today           Recently started on oral steroids for allergic type reaction     Wed / Thurs - asx   Fri - started to feel a bit nauseated off and on - would sometimes feel better after eating   Sat/ today - some nausea - did vomit x 1 within the past hr - looks like what she ate today - no bile - not black or bloody      Sx that she was taking the steriods for has resolved - was better right after she left the clinic and those sx have not returned     Not sound particularly ill on the phone to me       > No fever   > No diarrhea   > Not pregnant   > No belly pain   > No HA   > Stopped taking steroids after dose this afternoon   > Appetite low yesterday and today     A/P:   > OK for clear fluids tonight   > Advance to BRAT diet tomorrow as able   > CB if not better / worse / change / new sx and can reassess       Sukumar Ibarra, RN   Triage and Medication Refills        Reason for Disposition    Vomiting a prescription medication    Protocols used: VOMITING-A-AH

## 2021-05-27 VITALS
BODY MASS INDEX: 32.78 KG/M2 | DIASTOLIC BLOOD PRESSURE: 64 MMHG | HEART RATE: 70 BPM | SYSTOLIC BLOOD PRESSURE: 112 MMHG | WEIGHT: 200 LBS

## 2021-05-27 ASSESSMENT — PATIENT HEALTH QUESTIONNAIRE - PHQ9: SUM OF ALL RESPONSES TO PHQ QUESTIONS 1-9: 10

## 2021-05-28 ASSESSMENT — ANXIETY QUESTIONNAIRES
GAD7 TOTAL SCORE: 19
GAD7 TOTAL SCORE: 9

## 2021-05-28 NOTE — PATIENT INSTRUCTIONS - HE
Please contact crisis or go to the emergency room if you have thoughts of self harm or suicide.  Take medication as prescribed. Please do not make changes or adjustments to your medications without talking to a health professional first.  Contact the pharmacy if you are out of medication and in need of a refill.

## 2021-05-28 NOTE — PROGRESS NOTES
Patient here today for follow up of medication management. States depression 2.5/5 denies SI/HI, anxiety 3-4/5. States sleeps is okay, trouble staying asleep  PHQ-11  SHERLY-13    Nothing reported on [place]

## 2021-05-28 NOTE — PROGRESS NOTES
"Colposcopy Procedure Note    Indications: Pap smear on 3/5/19 showed ASCUS with positive HRHPV.  Pertinent past history includes an abnormal Pap about 10 years ago, but she didn't have to have a colposcopy.    Procedure Details   /70   Pulse 80   Ht 5' 5.5\" (1.664 m)   Wt 185 lb (83.9 kg)   LMP 04/25/2019   Body mass index is 30.32 kg/m .    The reason for procedure is explained, and informed consent is obtained.  Urine hCG is negative.    Speculum is placed in the vagina and visualization of cervix is achieved. The cervix is swabbed with 3% acetic acid solution. Transformation zone is easily seen.  Green filter is applied with no abnormal vessels seen.  Acetowhite epithelium is not seen.  ECC is not done.  Adequate colposcopy.  The patient tolerated the procedure well.    Impression: normal cervix    Plan: Post procedure instructions are reviewed.  The role of HPV in causing cervical pap smear abnormalities, dysplasia, and cancer is reviewed with the patient.  We discussed the role of the immune system and ways to keep it strong.  She was counseled to return to her primary for a Pap with HPV testing as recommended in a year.    "

## 2021-05-30 VITALS — WEIGHT: 188.9 LBS | BODY MASS INDEX: 30.96 KG/M2

## 2021-05-30 VITALS — HEIGHT: 66 IN | BODY MASS INDEX: 30.7 KG/M2 | WEIGHT: 191 LBS

## 2021-05-30 NOTE — PROGRESS NOTES
Correct pharmacy verified with patient and confirmed in snapshot? [x] yes []no    Charge captured ? [x] yes  [] no    Medications Phoned  to Pharmacy [] yes [x]no  Name of Pharmacist:  List Medications, including dose, quantity and instructions      Medication Prescriptions given to patient   [] yes  [x] no   List the name of the drug the prescription was written for.       Medications ordered this visit were e-scribed.  Verified by order class [] yes  [x] no    Medication changes or discontinuations were communicated to patient's pharmacy: [] yes  [x] no    UA collected [] yes  [x] no    [place] Prescription Monitoring Program Reviewed? [x] yes  [] no    Referrals were made to:none today      Future appointment was made: [x] yes  [] no    Dictation completed at time of chart check: [x] yes  [] no    I have checked the documentation for today s encounters and the above information has been reviewed and completed.

## 2021-05-30 NOTE — PATIENT INSTRUCTIONS - HE
Please contact crisis or go to the emergency room if you have thoughts of self harm or suicide.  Take medication as prescribed. Please do not make changes or adjustments to your medications without talking to a health professional first.  Contact the pharmacy if you are out of medication and in need of a refill.  Consider stopping smoking, it is the single most important thing you can do to improve your health.    F/U 3 month

## 2021-05-31 VITALS — WEIGHT: 188 LBS | BODY MASS INDEX: 30.81 KG/M2

## 2021-05-31 VITALS — BODY MASS INDEX: 31.15 KG/M2 | WEIGHT: 190.1 LBS

## 2021-06-01 VITALS — BODY MASS INDEX: 30.37 KG/M2 | HEIGHT: 66 IN | WEIGHT: 189 LBS

## 2021-06-01 VITALS — BODY MASS INDEX: 30.19 KG/M2 | WEIGHT: 184.2 LBS

## 2021-06-01 VITALS — WEIGHT: 183 LBS | BODY MASS INDEX: 29.99 KG/M2

## 2021-06-02 VITALS — HEIGHT: 66 IN | WEIGHT: 185 LBS | BODY MASS INDEX: 29.73 KG/M2

## 2021-06-02 VITALS — BODY MASS INDEX: 30.14 KG/M2 | WEIGHT: 183.9 LBS

## 2021-06-02 VITALS — HEIGHT: 66 IN | BODY MASS INDEX: 29.09 KG/M2 | WEIGHT: 181 LBS

## 2021-06-02 VITALS — BODY MASS INDEX: 29.41 KG/M2 | HEIGHT: 66 IN | WEIGHT: 183 LBS

## 2021-06-02 NOTE — TELEPHONE ENCOUNTER
Spoke to patient and she said she was in agreement with the plan to discuss things at her next appointment with RANJAN Padilla .  Also communicated RANJAN Askew directive regarding therapy.

## 2021-06-02 NOTE — TELEPHONE ENCOUNTER
Please contact Ms. Kitchen to acknowledge I have read her note and will discuss at our next appointment, though if she prefers to give therapy some time prior to starting medicaiton, that sounds reasonable and she can reschedule with me 4-6 weeks from now.      Thank you.    Hailey

## 2021-06-03 VITALS
WEIGHT: 204 LBS | BODY MASS INDEX: 32.02 KG/M2 | DIASTOLIC BLOOD PRESSURE: 60 MMHG | SYSTOLIC BLOOD PRESSURE: 125 MMHG | HEIGHT: 67 IN | HEART RATE: 92 BPM

## 2021-06-03 VITALS
OXYGEN SATURATION: 100 % | BODY MASS INDEX: 32.49 KG/M2 | SYSTOLIC BLOOD PRESSURE: 128 MMHG | WEIGHT: 207 LBS | HEIGHT: 67 IN | HEART RATE: 90 BPM | DIASTOLIC BLOOD PRESSURE: 78 MMHG

## 2021-06-03 VITALS — BODY MASS INDEX: 30.53 KG/M2 | HEIGHT: 66 IN | WEIGHT: 190 LBS

## 2021-06-04 VITALS
DIASTOLIC BLOOD PRESSURE: 66 MMHG | SYSTOLIC BLOOD PRESSURE: 110 MMHG | HEART RATE: 72 BPM | WEIGHT: 191 LBS | BODY MASS INDEX: 29.91 KG/M2

## 2021-06-04 VITALS
BODY MASS INDEX: 31.48 KG/M2 | OXYGEN SATURATION: 97 % | HEART RATE: 90 BPM | SYSTOLIC BLOOD PRESSURE: 124 MMHG | DIASTOLIC BLOOD PRESSURE: 74 MMHG | WEIGHT: 201 LBS

## 2021-06-04 VITALS
BODY MASS INDEX: 31.57 KG/M2 | RESPIRATION RATE: 16 BRPM | HEIGHT: 67 IN | HEART RATE: 90 BPM | TEMPERATURE: 98 F | WEIGHT: 201.13 LBS | OXYGEN SATURATION: 98 % | DIASTOLIC BLOOD PRESSURE: 82 MMHG | SYSTOLIC BLOOD PRESSURE: 129 MMHG

## 2021-06-04 VITALS
DIASTOLIC BLOOD PRESSURE: 70 MMHG | HEART RATE: 68 BPM | SYSTOLIC BLOOD PRESSURE: 100 MMHG | WEIGHT: 199 LBS | BODY MASS INDEX: 31.17 KG/M2

## 2021-06-04 VITALS
BODY MASS INDEX: 32.33 KG/M2 | HEIGHT: 67 IN | DIASTOLIC BLOOD PRESSURE: 89 MMHG | SYSTOLIC BLOOD PRESSURE: 132 MMHG | WEIGHT: 206 LBS | HEART RATE: 85 BPM

## 2021-06-04 VITALS
WEIGHT: 206 LBS | SYSTOLIC BLOOD PRESSURE: 120 MMHG | HEART RATE: 66 BPM | BODY MASS INDEX: 32.33 KG/M2 | DIASTOLIC BLOOD PRESSURE: 72 MMHG | HEIGHT: 67 IN

## 2021-06-04 NOTE — PROGRESS NOTES
Subjective:      Janny is a 29 y.o. female who presents for evaluation vaginal symptoms. Symptoms have been present since yesterday-- noticed a small bump on her vulva. Was intact, but Janny picked at it and feels like it's now open  Denies pain, itching, irritation. Possible increase in vaginal discharge with odor. Has also been feeling urge incontinence when arriving home and when in bed at night. At night when she tries to void often has no urine.  She denies abnormal bleeding, blisters, burning, lesions, local irritation, pain, tears and vulvar itching.      Sexually transmitted infection risk: possible STD exposure. No new partners in the last year, but has not been tested since her last new partner. Positive for chlamydia in the past, HPV + by pap last year. Feels she is at low risk for HIV, syphilis & hepatitis, declines testing. Accepts GC/CT and wet prep today.    Menstrual flow: regular every 28-30 days    Contraception: NuvaRing vaginal inserts.     Review of Systems  Also a 12 point comprehensive review of systems was negative except as noted.       Objective:     Vitals:    12/09/19 1310   BP: 128/78   Pulse: 90   SpO2: 100%         Physical Exam:  General: Pleasant, articulate, well-groomed, well-nourished female.  Not in any apparent distress.    Abdomen: Soft, non-tender, no masses  Pelvic: Normally developed genitalia with no external lesions or eruptions. Skin at vestibule slightly reddened, inflamed. Janny directed this CNM to the bump she was feeling-- slight tear at perineum approximately 3 mm long, otherwise skin intact with no visible lesion or growth. Swabbed this tear for HSV. Vaginal swabs for GC/CT, wet prep. Janny reports some pain with these swabs; spotting noted on swabs.       Assessment / Plan:     1)  STI testing: GC/CT, wet prep, UA. Will treat pending results. HSV swab-- discussed low likelihood of this infection given symptoms reported and physical exam, but no cure for HSV  if positive test.  If genital warts, would wait to see if spontaneously resolved. Again discussed no cure, able to transmit HPV regardless of whether or not warts present.  2) Urinary urgency: Symptoms inconclusive. UA today, UC if positive. Janny would consider accepting a referral for pelvic floor PT in the future if the UA was negative and symptoms persist. [facility]   3) History of HPV: Received 3 dose series of HPV quadrivalent vaccine. Provided information via [employer] about considering the Gardasil 9 (not currently recommended by CDC, but not contraindicated-- patients can consider the r/b/a, including possible cost if not covered by insurance.    Devika Fish, ANN, CNM        TT = 25 minutes of time of which >50% on education/counseling/care-coordination

## 2021-06-04 NOTE — TELEPHONE ENCOUNTER
Patient has a lab appt.for urine chalamydia CGA  No orders available.  Please review and place orders needed.  Thank you  Lab

## 2021-06-04 NOTE — TELEPHONE ENCOUNTER
Patient has a lab appt.for urine chlamydia/CGA.  No orders available.  Please review and place orders needed.  Thank you  Lab

## 2021-06-05 VITALS
BODY MASS INDEX: 31.48 KG/M2 | HEART RATE: 72 BPM | WEIGHT: 201 LBS | SYSTOLIC BLOOD PRESSURE: 120 MMHG | DIASTOLIC BLOOD PRESSURE: 60 MMHG

## 2021-06-05 VITALS — WEIGHT: 201 LBS | DIASTOLIC BLOOD PRESSURE: 70 MMHG | SYSTOLIC BLOOD PRESSURE: 124 MMHG | BODY MASS INDEX: 31.48 KG/M2

## 2021-06-05 NOTE — PROGRESS NOTES
Subjective:      Patient ID: Janny Kitchen is a 29 y.o. female.    Chief Complaint:    Patient presents with complaints of ST for 5 days.  Patient states she had a fever of 101.8 for 3 days prior.  She also states she had or sex and is concerned about STD as a cause for the ST.  Patient has been treating with OTC medications.  Complaints of a minor cough.  No ear pain.         The following portions of the patient's history were reviewed and updated as appropriate: allergies, current medications, past family history, past medical history, past social history, past surgical history and problem list.       Past Medical History:   Diagnosis Date     Abnormal Pap smear of cervix 03/05/2019    atypical squamous cells   with positive HPV     Anxiety      Chlamydia 08/14/2018     Depression        Past Surgical History:   Procedure Laterality Date     lioma       LIPOMA RESECTION      back        Family History   Problem Relation Age of Onset     Cancer Mother         skin cancer      Mental illness Mother         Patient speculates on undiagnosed emotional issues.     Alcohol abuse Mother         Has history of excess, per patient.     Hypertension Father      Heart disease Father      Obesity Brother         half brother     Diabetes type II Brother        Social History     Tobacco Use     Smoking status: Never Smoker     Smokeless tobacco: Never Used   Substance Use Topics     Alcohol use: Yes     Alcohol/week: 3.0 standard drinks     Types: 3 Cans of beer per week     Comment: socially      Drug use: No       Review of Systems   Constitutional: Positive for fever.   HENT: Positive for sore throat.    Eyes: Negative.    Respiratory: Positive for cough.    Cardiovascular: Negative.    Gastrointestinal: Negative.    Skin: Negative.    All other systems reviewed and are negative.      Objective:     /82 (Patient Site: Right Arm, Patient Position: Sitting, Cuff Size: Adult Regular)   Pulse 90   Temp 98  F (36.7  " C) (Oral)   Resp 16   Ht 5' 7\" (1.702 m)   Wt 201 lb 2 oz (91.2 kg)   SpO2 98%   BMI 31.50 kg/m      Physical Exam  Vitals signs and nursing note reviewed.   Constitutional:       General: She is not in acute distress.     Appearance: Normal appearance. She is obese. She is not ill-appearing, toxic-appearing or diaphoretic.   HENT:      Right Ear: Tympanic membrane, ear canal and external ear normal.      Left Ear: Tympanic membrane, ear canal and external ear normal.      Nose: Nose normal.      Mouth/Throat:      Mouth: Mucous membranes are moist.      Pharynx: Oropharynx is clear. Posterior oropharyngeal erythema present. No oropharyngeal exudate.   Eyes:      Extraocular Movements: Extraocular movements intact.      Conjunctiva/sclera: Conjunctivae normal.   Neck:      Musculoskeletal: No neck rigidity or muscular tenderness.   Cardiovascular:      Rate and Rhythm: Normal rate and regular rhythm.      Pulses: Normal pulses.      Heart sounds: Normal heart sounds.   Pulmonary:      Effort: Pulmonary effort is normal. No respiratory distress.      Breath sounds: Normal breath sounds. No stridor. No wheezing, rhonchi or rales.   Skin:     General: Skin is warm and dry.      Capillary Refill: Capillary refill takes less than 2 seconds.   Neurological:      General: No focal deficit present.      Mental Status: She is alert.   Psychiatric:         Mood and Affect: Mood normal.         Assessment:     Procedures    The encounter diagnosis was Throat pain.    Plan:     Patient would like to prophylactically treat GC.  1 g azthromax and 250 mg rocepphin IM.  Discussed the timeliness of culture results.      "

## 2021-06-05 NOTE — TELEPHONE ENCOUNTER
Spoke with patient this evening.  Informed her that her throat culture is positive for Beta Hemolytic Streptococcus (not Group A).  Recommend treating with amoxicillin 500 mg twice daily for 10 days.  Advised patient that additional testing for Gonorrhea and Chlamydia is still in process.  Patient voiced understanding of this information and had no additional questions at this time.    Marcio Baez MD 01/21/20 7:27 PM

## 2021-06-06 NOTE — PROGRESS NOTES
Psychiatric Nurse Practitioner Progress Note    Date: 03/16/2020  Care Provider: Hailey Padilla CNP    Assessment / Impression  1. SHERLY: Anxiety is generally improved, even with elevated stressor-being around her mother for a long weekend.She is doing individual psychotherapy, with 4 visits under her belt.  She would like to commit to doing individual therapy for the next several months to work on depression and anxiety.  Per interview, this is congruent with PHQ 9 and SHERLY 7 (see scores below).  I reviewed that medications and therapy together are better than an either separate, client expresses an understanding of this.  At this point, the plan is for her to continue with individual psychotherapy, and if things are still challenging or problematic from her standpoint of depression and anxiety in 6 months, restart medications.  She felt the medications were helpful.  She was on bupropion and fluoxetine, vdnlafaxine, and citalopram previously.    Individual therapy    RTC prn if medications therapy is felt to be indicated. collagoratvie decision making approach ustilized as client has good insight and awareness of her health / mental health issues and we discussed various medicaiton possiblilities.      Current SHERLY 7 elevated related to social circumstances.     2. MDD rec. mild: She is doing individual psychotherapy, with 4 visits under her belt.  She would like to commit to doing individual therapy for the next several months to work on depression and anxiety.  Per interview, this is congruent with PHQ 9 and SHERLY 7 (see scores below).  I reviewed that medications and therapy together are better than an either separate, client expresses an understanding of this.  At this point, the plan is for her to continue with individual psychotherapy, and if things are still challenging or problematic from her standpoint of depression and anxiety in 6 months, restart medications.  She felt the medications were helpful.  She was  "on bupropion and fluoxetine previously.    Motivational interviewing around therapy     3. Follow-up in 3 month or sooner if needed.    Clinical Global Impressions  3 (Mildly ill)          HPI: Janny Kitchen is a 29 y.o. female with history of SHERLY and MDD following up for the same.  She reports More financial stress. \"last year was generally slow.\" \"No are we standing on the edge of a arsenio? She does not want a big birthday party and she feels there may be expectation around this.\" She feel that her family will pressure her or organize a big party as her family typically does this. She says there are boundary issues with family around not respecting her preferences and she does her best to establish boundaries.      Venlafaxine: significant discontinuation symptoms  Citalopram: can't recalll    Psychiatric Review of Systems:  Tearfulness: \"Yes\" for about 2-3 weeks.  More financial stress  Panic: No  Sleep: \"wake up in the night and worrying.\"  Sometimes getting excess sleep.  Anxiety: yes  Depression: yes  Rosa Elena:no  Hearing voices or seeing things:  Thoughts of self harm or suicide: No thoughts of self harm or suicide.  OCD: No  Appetite: \"I\"m trying to eat better.\"  Weight loss/gain: \"I've been pretty stable.\"    Allergies   Allergen Reactions     Venomil Honey Bee Venom [Hymenoptera Allergenic Extract] Shortness Of Breath, Swelling and Rash     Wasp Venom Anaphylaxis     Sertraline Diarrhea     Chronic diarrhea and weight gain.  Went away when stopped.       Current Outpatient Medications   Medication Sig Dispense Refill     EPINEPHrine (EPIPEN/ADRENACLICK) 0.3 mg/0.3 mL injection Inject 0.3 mL (0.3 mg total) as directed as needed for anaphylaxis. Inject into thigh. 2 Pre-filled Pen Syringe 0     etonogestrel-ethinyl estradiol (NUVARING) 0.12-0.015 mg/24 hr vaginal ring Insert 1 each into the vagina every 28 days. Insert vaginally and leave in place for 3 consecutive weeks, then remove for 1 week.       Current " "Facility-Administered Medications   Medication Dose Route Frequency Provider Last Rate Last Dose     azithromycin powder 1 g (ZITHROMAX)  1 g Oral Once Martha Bryant PA-C             Medication adherence: Reviewed risk/benefits of medication , Patient able to verbalize understanding of side effects  and Patient verbally consents to taking medications  [place] Prescription Monitoring program: No worrisome pharmacy activity    Clinical Outcomes Measures:  PHQ-9: PHQ-9 Total Score: 10  SHERLY-7:        Lab Results:   No visits with results within 30 Day(s) from this visit.   Latest known visit with results is:   Office Visit on 01/20/2020   Component Date Value     Rapid Strep A Antigen 01/20/2020 No Group A Strep detected, presumptive negative      Culture 01/20/2020 Usual tonio with      Culture 01/20/2020 4+  Beta Hemolytic Streptococcus Not Group A*     Culture 01/20/2020 No Neisseria gonorrhoeae      Specimen Description 01/20/2020 Throat      Chlamydia trachomatis PCR 01/20/2020 Negative        Review of Systems: As noted above, otherwise a 12 point review of systems is negative.      Psychiatric Examination:  Vitals:    03/16/20 1214   BP: 132/89   Pulse: 85   Weight: 206 lb (93.4 kg)   Height: 5' 7\" (1.702 m)   PainSc: 0-No pain     Body mass index is 32.26 kg/m .  Appearance: The patient appears stated age, is appropriately dressed for the weather with good hygiene and grooming.   Reliability:  Patient appears to be an adequate historian.    Behavior: Patient makes good eye contact, and engages with normal rapport in the interview.   There is no evidence of responding to hallucinations or flashbacks.  Speech: Speech is spontaneous and coherent, with a normal rate, rhythm and tone.    Language:There are no difficulties with expressive or receptive language as observed throughout the interview.    Mood: Described as \"just stressed with everything that's going on.\"   Affect: Congruent and shows a normal " range and level of reactivity.    Judgement: Able to make basic decision regarding safety.  Insight: Good awareness of physical and mental health conditions and aware of needs around care for these.  Gait and station: Steady, normal gait.    Thought process: Logical   Thought content: No evidence of delusions or paranoia.  No thoughts of self harm or suicide. No thoughts of harming others.  Associations: Connected  Fund of knowledge: Average  Attention / Concentration: Able to remain focused during the interview with minimal distractibility or need for redirection.  Short Term Memory: Grossly intact as evidence by client recalling themes and ideas discussed.  Long Term Memory: Intact  No abnormal motor movements observed.      [facility]  Psychiatry Nurse Practitioner

## 2021-06-07 NOTE — TELEPHONE ENCOUNTER
RN cannot approve Refill Request    RN can NOT refill this medication med is not covered by policy/route to provider     . Last office visit: 6/11/2018 Lisa Arredondo FNP Last Physical: Visit date not found Last MTM visit: Visit date not found Last visit same specialty: 6/11/2018 Lisa Arredondo FNP.  Next visit within 3 mo: Visit date not found  Next physical within 3 mo: Visit date not found      India Paula, [facility] Triage/Med Refill 4/28/2020    Requested Prescriptions   Pending Prescriptions Disp Refills     EPINEPHrine (EPIPEN/ADRENACLICK/AUVI-Q) 0.3 mg/0.3 mL injection 2 Pre-filled Pen Syringe 0     Sig: Inject 0.3 mL (0.3 mg total) as directed as needed for anaphylaxis. Inject into thigh.       There is no refill protocol information for this order

## 2021-06-09 NOTE — PROGRESS NOTES
Internal Medicine Office Visit  [facility]   Patient Name: Janny Kitchen  Patient Age: 30 y.o.  YOB: 1990  MRN: [id number]    Date of Visit: 2020  Reason for Office Visit:   Chief Complaint   Patient presents with     Obesity     talk about options     Food Intolerance           Assessment / Plan / Medical Decision Makin. Diarrhea, unspecified type  Ongoing since teenager. There is not family history of autoimmune conditions that she is aware of. Inflammatory bowel is a possibility, she is not taking medications that would cause diarrhea   - If lab evaluation and stool tests are normal, will refer to GI  - Trial an elimination diet of 2 weeks without dairy, 2 weeks without gluten. Journal findings or changes   - Thyroid Stimulating Hormone (TSH)  - Celiac(Gluten)Antibody Panel  - C-Reactive Protein(CRP)  - Sedimentation Rate  - HM1(CBC and Differential)  - HM1 (CBC with Diff)  - Calprotectin,Feces(CALPRF); Future    2. BMI 32.0-32.9,adult  She is interested in meeting with the non-surgical weight loss team, particularly is interested in talking with a dietician about changing her eating habits. We discussed an option to do medication therapy for a short time to also help springboard weight loss. If it is not covered for her to go to the weight loss clinic (she does not have a secondary comorbidity associated with obesity), she will contact the office for phentermine prescription   - Ambulatory referral to Bariatric Care: Surgical and Non-Surgical        Health Maintenance Review  Health Maintenance   Topic Date Due     DEPRESSION ACTION PLAN  1990     HEPATITIS B VACCINES (3 of 3 - 3-dose primary series) 2003     HIV SCREENING  2005     ADVANCE CARE PLANNING  2008     PREVENTIVE CARE VISIT  2016     INFLUENZA VACCINE RULE BASED (1) 2020     PAP SMEAR  2022     TD 18+ HE  2028     TDAP ADULT ONE TIME DOSE  Completed  "        I have discontinued Janny Kitchen's etonogestreL-ethinyl estradioL. I am also having her maintain her EPINEPHrine. We will continue to administer azithromycin.      HPI:  Janny Kitchen is a 30 y.o. year old who presents to the office today for concerns of her weight. She has used the extra time due to the COVID outbreak to exercise regularly and eat healthy, despite these changes she does not see changes in her weight. 24 hour recall:     Bfast- turkey sandwich  Lunch- large salad  Dinner- protein, veggie, and potato   Snacks- rarely. Rare sweets.  Drinks- fizzy water, alcohol (rarely, socially on weekend), coffee with cream (once per week)    We also reviewed a c/o diarrhea. She recalls that she has \"always\" has a sensitive stomach with abdominal pain and diarrhea, started in her teens. Multiple days per week she has diarrhea, \"pretty intense\", she will eat something and it will \"run out of her\". She has tried Imodium for symptoms which gives her 1-2 days of relief. If she has a really \"violent diarrhea\" she will sometimes see blood at the end of the BM. She has a cramping discomfort in the abdomen frequently. After a BM, she has temporary relief of pain.     Review of Systems- pertinent positive in bold:  Negative for unintentional weight loss, black stools. No vision changes. No skin rashes       Current Scheduled Meds:  Outpatient Encounter Medications as of 7/16/2020   Medication Sig Dispense Refill     EPINEPHrine (EPIPEN/ADRENACLICK/AUVI-Q) 0.3 mg/0.3 mL injection Inject 0.3 mL (0.3 mg total) as directed as needed for anaphylaxis. Inject into thigh. 2 Pre-filled Pen Syringe 0     [DISCONTINUED] etonogestrel-ethinyl estradiol (NUVARING) 0.12-0.015 mg/24 hr vaginal ring Insert 1 each into the vagina every 28 days. Insert vaginally and leave in place for 3 consecutive weeks, then remove for 1 week.       Facility-Administered Encounter Medications as of 7/16/2020   Medication Dose Route Frequency " "Provider Last Rate Last Dose     azithromycin powder 1 g (ZITHROMAX)  1 g Oral Once Martha Bryant PA-C           Past Medical History:   Diagnosis Date     Abnormal Pap smear of cervix 03/05/2019    atypical squamous cells   with positive HPV     Anxiety      Chlamydia 08/14/2018     Depression      Past Surgical History:   Procedure Laterality Date     lioma       LIPOMA RESECTION      back      Social History     Tobacco Use     Smoking status: Never Smoker     Smokeless tobacco: Never Used   Substance Use Topics     Alcohol use: Yes     Alcohol/week: 3.0 standard drinks     Types: 3 Cans of beer per week     Comment: socially      Drug use: No       Objective / Physical Examination:  Vitals:    07/16/20 1228   BP: 120/72   Pulse: 66   Weight: 206 lb (93.4 kg)   Height: 5' 7\" (1.702 m)     Wt Readings from Last 3 Encounters:   07/16/20 206 lb (93.4 kg)   01/20/20 201 lb 2 oz (91.2 kg)   12/09/19 207 lb (93.9 kg)     Body mass index is 32.26 kg/m .     Constitutional: In no apparent distress  Eyes: PERRL  ENT: No thyromegaly or thyroid nodules   Respiratory: Clear to auscultation bilaterally. Normal inspiratory and expiratory effort  Cardiovascular: Regular rate and rhythm. No murmurs, rubs, or gallops  Gastrointestinal: Bowel sounds active all four quadrants. Soft, non-tender.   Skin: No rashes       Orders Placed This Encounter   Procedures     Thyroid Stimulating Hormone (TSH)     Celiac(Gluten)Antibody Panel     C-Reactive Protein(CRP)     Sedimentation Rate     HM1 (CBC with Diff)     Calprotectin,Feces(CALPRF)     Ambulatory referral to Bariatric Care: Surgical and Non-Surgical   Followup: Return in about 1 year (around 7/16/2021) for Annual physical. earlier if needed.        Lisa Arredondo, CNP    "

## 2021-06-10 NOTE — PROGRESS NOTES
Internal Medicine Office Visit  [facility]   Patient Name: Janny Kitchen  Patient Age: 30 y.o.  YOB: 1990  MRN: [id number]    Date of Visit: 2020  Reason for Office Visit:   Chief Complaint   Patient presents with     Medication Management           Assessment / Plan / Medical Decision Makin. BMI 32.0-32.9,adult  - INCREASE dose from once daily to: phentermine 8 mg Tab; Take 8 mg by mouth 3 (three) times a day.  Dispense: 90 each; Refill: 0  - Commended for increase in physical activity and dietary         Health Maintenance Review  Health Maintenance   Topic Date Due     DEPRESSION ACTION PLAN  1990     HEPATITIS B VACCINES (3 of 3 - 3-dose primary series) 2003     HIV SCREENING  2005     ADVANCE CARE PLANNING  2008     PREVENTIVE CARE VISIT  2016     INFLUENZA VACCINE RULE BASED (1) 2020     PAP SMEAR  2022     TD 18+ HE  2028     TDAP ADULT ONE TIME DOSE  Completed         I am having Janny Kitchen start on phentermine. I am also having her maintain her EPINEPHrine and phentermine. We will continue to administer azithromycin.      HPI:  Janny Kitchen is a 30 y.o. year old who presents to the office today for follow up of weight loss. She started the phentermine and tolerated this very well. She did a lot of research about how to use the medication to jump start her weight loss. No issues with anxiety or depression when she started the phentermine.   Starting wt loss: 210 lb  Weight loss so far: 9 lbs    Diarrhea reviewed. She saw [facility] virtually and has a follow up in September. She still needs to collect the stool sample. We reviewed the FODMAP diet. Her stools have actually improved as her mood has improved which makes her question the relation between these.     Review of Systems- pertinent positive in bold:  Negative for melena/hematochezia       Current Scheduled Meds:  Outpatient Encounter Medications as  of 8/27/2020   Medication Sig Dispense Refill     EPINEPHrine (EPIPEN/ADRENACLICK/AUVI-Q) 0.3 mg/0.3 mL injection Inject 0.3 mL (0.3 mg total) as directed as needed for anaphylaxis. Inject into thigh. 2 Pre-filled Pen Syringe 0     phentermine 8 mg Tab Take 8 mg by mouth daily. 30 each 0     phentermine 8 mg Tab Take 8 mg by mouth 3 (three) times a day. 90 each 0     Facility-Administered Encounter Medications as of 8/27/2020   Medication Dose Route Frequency Provider Last Rate Last Dose     azithromycin powder 1 g (ZITHROMAX)  1 g Oral Once Martha Bryant PA-C             Past Medical History:   Diagnosis Date     Abnormal Pap smear of cervix 03/05/2019    atypical squamous cells   with positive HPV     Anxiety      Chlamydia 08/14/2018     Depression      Past Surgical History:   Procedure Laterality Date     lioma       LIPOMA RESECTION      back      Social History     Tobacco Use     Smoking status: Never Smoker     Smokeless tobacco: Never Used   Substance Use Topics     Alcohol use: Yes     Alcohol/week: 3.0 standard drinks     Types: 3 Cans of beer per week     Comment: socially      Drug use: No       Objective / Physical Examination:  Vitals:    08/27/20 1109   BP: 100/70   Pulse: 68   Weight: 199 lb (90.3 kg)     Wt Readings from Last 3 Encounters:   08/27/20 199 lb (90.3 kg)   08/25/20 201 lb (91.2 kg)   07/16/20 206 lb (93.4 kg)     Body mass index is 31.17 kg/m .     Constitutional: In no apparent distress  Respiratory: Clear to auscultation bilaterally. Normal inspiratory and expiratory effort  Cardiovascular: Regular rate and rhythm  Psych: Alert and oriented x3.     No orders of the defined types were placed in this encounter.  Followup: Return in about 4 weeks (around 9/24/2020) for Next scheduled follow up. earlier if needed.          Lisa Arredondo, CNP

## 2021-06-10 NOTE — TELEPHONE ENCOUNTER
8/20 - pt talked to [facility], they told her to allow 3 full days so the records should come 8/21 or certainly by 8/24.

## 2021-06-10 NOTE — TELEPHONE ENCOUNTER
8/20- Called [facility], MERYL form needed. Left message for pt to call us. Pt called back, she signed their MERYL form online the day she made the appt. She will call them and have them look for online MERYL email.

## 2021-06-10 NOTE — PROGRESS NOTES
SUBJECTIVE:   Pt presents with sore throat for 3 days. Fever absent. Other symptoms: achiness and today she developed some sores on her tongue.    OBJECTIVE:   Appears well.  Eyes: no redness or discharge  Nose: no discharge  Oropharynx: Normal OP. Tongue has an aphthous ulcer on the left mid lateral side. Few punctate lesions on the anterior left and right tonghe.  Neck: no adenopathy  Lungs: Normal breathing  Skin: no rashes      Rapid Strep test: negative    ASSESSMENT:   Pharyngitis with a negative RST. An Aphthous ulcer on the left side. This appears to be viral.    PLAN:     Patient Instructions     No signs of strep.    Sx appear to be due to a viral illness.    I recommend using salt water rinses a few time a day    OK to use the numbing medicine    Sx may continue for up to a week or so.

## 2021-06-11 NOTE — PROGRESS NOTES
Internal Medicine Office Visit  [facility]   Patient Name: Janny Kitchen  Patient Age: 30 y.o.  YOB: 1990  MRN: [id number]    Date of Visit: 2020  Reason for Office Visit:   Chief Complaint   Patient presents with     Medication Management           Assessment / Plan / Medical Decision Makin. BMI 29.0-29.9,adult  - Commended for weight loss and life style changes associated with this  - Continue 1 more month of the medication then discontinue   - phentermine 8 mg Tab; Take 8 mg by mouth 2 (two) times a day.  Dispense: 60 each; Refill: 0        Health Maintenance Review  Health Maintenance   Topic Date Due     DEPRESSION ACTION PLAN  1990     HEPATITIS B VACCINES (3 of 3 - 3-dose primary series) 2003     HIV SCREENING  2005     ADVANCE CARE PLANNING  2008     PREVENTIVE CARE VISIT  2016     PAP SMEAR  2022     TD 18+ HE  2028     INFLUENZA VACCINE RULE BASED  Completed     TDAP ADULT ONE TIME DOSE  Completed     Pneumococcal Vaccine: Pediatrics (0 to 5 Years) and At-Risk Patients (6 to 64 Years)  Aged Out         I have discontinued Janny Kitchen's phentermine. I have also changed her phentermine. Additionally, I am having her maintain her EPINEPHrine. We will continue to administer azithromycin.      HPI:  Janny Kitchen is a 30 y.o. year old who presents to the office today for follow up. She is still doing well with the phentermine, maybe some constipation. She has been pretty busy in the past month but has still been exercising at least 3 days per week. Doing some body weight exercises and walking. Doing really well with portion control and her goal next month is to work on the actual foods she is eating.   Starting wt loss: 210 lb  Weight loss so far: 19 lbs    Diarrhea reviewed. Stools have been a lot better recently, she wonders if this is attributed to improved anxiety or eating less. She postponed her  appointment with GI.     Anxiety was reviewed, she has been doing well with this. Seeing a therapist every other week.     Review of Systems:  As in HPI       Current Scheduled Meds:  Outpatient Encounter Medications as of 9/28/2020   Medication Sig Dispense Refill     EPINEPHrine (EPIPEN/ADRENACLICK/AUVI-Q) 0.3 mg/0.3 mL injection Inject 0.3 mL (0.3 mg total) as directed as needed for anaphylaxis. Inject into thigh. 2 Pre-filled Pen Syringe 0     phentermine 8 mg Tab Take 8 mg by mouth 2 (two) times a day. 60 each 0     [DISCONTINUED] phentermine 8 mg Tab Take 8 mg by mouth daily. 30 each 0     [DISCONTINUED] phentermine 8 mg Tab Take 8 mg by mouth 3 (three) times a day. 90 each 0     Facility-Administered Encounter Medications as of 9/28/2020   Medication Dose Route Frequency Provider Last Rate Last Dose     azithromycin powder 1 g (ZITHROMAX)  1 g Oral Once Martha Bryant PA-C             Past Medical History:   Diagnosis Date     Abnormal Pap smear of cervix 03/05/2019    atypical squamous cells   with positive HPV     Anxiety      Chlamydia 08/14/2018     Depression      Past Surgical History:   Procedure Laterality Date     lioma       LIPOMA RESECTION      back      Social History     Tobacco Use     Smoking status: Never Smoker     Smokeless tobacco: Never Used   Substance Use Topics     Alcohol use: Yes     Alcohol/week: 3.0 standard drinks     Types: 3 Cans of beer per week     Comment: socially      Drug use: No       Objective / Physical Examination:  Vitals:    09/28/20 1139   BP: 110/66   Pulse: 72   Weight: 191 lb (86.6 kg)     Wt Readings from Last 3 Encounters:   09/28/20 191 lb (86.6 kg)   08/27/20 199 lb (90.3 kg)   08/25/20 201 lb (91.2 kg)     Body mass index is 29.91 kg/m .     Constitutional: In no apparent distress  Respiratory: Clear to auscultation bilaterally. Normal inspiratory and expiratory effort  Cardiovascular: Regular rate and rhythm. No murmurs, rubs, or gallops. No  edema. No carotid bruits.   Psych: Alert and oriented x3.     Orders Placed This Encounter   Procedures     Influenza, Seasonal Quad, PF =/> 6months   Followup: Return in about 6 months (around 3/28/2021) for Next scheduled follow up. earlier if needed.        Lisa Arredondo, CNP

## 2021-06-13 NOTE — PATIENT INSTRUCTIONS - HE
Dear Janny Kitchen,    Your symptoms show that you may have coronavirus (COVID-19). This illness can cause fever, cough and trouble breathing. Many people get a mild case and get better on their own. Some people can get very sick.    Will I be tested for COVID-19?  We would like to test you for Covid-19 virus. I have placed orders for this test.   To schedule: go to your [employer] home page and scroll down to the section that says  You have an appointment that needs to be scheduled  and click the large green button that says  Schedule Now  and follow the steps to find the next available openings.    If you are unable to complete these [employer] scheduling steps, please call [phone] to schedule your testing.     When it's time for your COVID test:  Stay at least 6 feet away from others. (If someone will drive you to your test, stay in the backseat, as far away from the  as you can.)  Cover your mouth and nose with a mask, tissue or washcloth.  Go straight to the testing site. Don't make any stops on the way there or back.    Starting now:     Do not go to work. Follow your usual processes for taking time away from work.  o If you receive a negative COVID-19 test result and were NOT exposed to someone with a known positive COVID-19 test, you can return to work once you're free of fever for 24 hours without fever-reducing medication and your symptoms are improving or resolved.  o If you receive a positive COVID-19 test result, return to work should be at least 10 days from symptom onset (20 days if people have immune compromise) and people should be fever-free for 24 hours without medications, and the respiratory symptoms should be improved significantly before returning to work or school  o If you were exposed to someone who has tested positive for COVID-19, you can return to work 14 days after your last contact with the positive individual, provided you do not have symptoms at all during that  "time.    During this time, don't leave the house except for testing or medical care.  o Stay in your own room, even for meals. Use your own bathroom if you can.  o Stay away from others in your home. No hugging, kissing or shaking hands. No visitors.  o Don't go to work, school or anywhere else.    Clean \"high touch\" surfaces often (doorknobs, counters, handles, etc.). Use a household cleaning spray or wipes. You'll find a full list of  on the EPA website: www.epa.gov/pesticide-registration/list-n-disinfectants-use-against-sars-cov-2.    Cover your mouth and nose with a mask, tissue or washcloth to avoid spreading germs.    Wash your hands and face often. Use soap and water.    People in these groups are at risk for severe illness due to COVID-19:  o People 65 years and older  o People who live in a nursing home or long-term care facility  o People with chronic disease (lung, heart, cancer, diabetes, kidney, liver, immunologic)  o People who have a weakened immune system, including those who:  - Are in cancer treatment  - Take medicine that weakens the immune system, such as corticosteroids  - Had a bone marrow or organ transplant  - Have an immune deficiency  - Have poorly controlled HIV or AIDS  - Are obese (body mass index of 40 or higher)  - Smoke regularly      Caregivers should wear gloves while washing dishes, handling laundry and cleaning bedrooms and bathrooms.    Use caution when washing and drying laundry: Don't shake dirty laundry, and use the warmest water setting that you can.    For more tips, go to www.cdc.gov/coronavirus/2019-ncov/downloads/10Things.pdf.    Sign up for [employer]. We know it's scary to hear that you might have COVID-19. We want to track your symptoms to make sure you're okay over the next 2 weeks. Please look for an email from [employer]--this is a free, online program that we'll use to keep in touch. To sign up, follow the link in the email you will receive. Learn more " at [email or web address]    How can I take care of myself?    Get lots of rest. Drink extra fluids (unless a doctor has told you not to)    Take Tylenol (acetaminophen) for fever or pain. If you have liver or kidney problems, ask your family doctor if it's okay to take Tylenol.  Adults can take either:    650 mg (two 325 mg pills) every 4 to 6 hours, or     1,000 mg (two 500 mg pills) every 8 hours as needed.    Note: Don't take more than 3,000 mg in one day. Acetaminophen is found in many medicines (both prescribed and over-the-counter medicines). Read all labels to be sure you don't take too much.  For children, check the Tylenol bottle for the right dose. The dose is based on the child's age or weight.    If you have other health problems (like cancer, heart failure, an organ transplant or severe kidney disease): Call your specialty clinic if you don't feel better in the next 2 days.    Know when to call 911. Emergency warning signs include:  Trouble breathing or shortness of breath  Pain or pressure in the chest that doesn't go away  Feeling confused like you haven't felt before, or not being able to wake up  Bluish-colored lips or face    Where can I get more information?    [facility] - About COVID-19: [email or web address]  CDC - What to Do If You're Sick: www.cdc.gov/coronavirus/2019-ncov/about/steps-when-sick.html      Dear Janny Kitchen,    Your symptoms show that you may have coronavirus (COVID-19). This illness can cause fever, cough and trouble breathing. Many people get a mild case and get better on their own. Some people can get very sick.    Will I be tested for COVID-19?  We would like to test you for Covid-19 virus. I have placed orders for this test.     For all employees or close contacts (except [place] and [place] - see below), go to your [employer] home page and scroll down to the section that says  You have an appointment that needs to be scheduled  and click  "the large green button that says  Schedule Now  and follow the steps to find the next available opening.     If you are unable to complete these steps or if you cannot find any available times, please call [phone] to schedule employee testing.       [place] employees or close contacts, please call [phone].   [place] [employer] employees or close contacts call [phone].    When it's time for your COVID test:  Stay at least 6 feet away from others. (If someone will drive you to your test, stay in the backseat, as far away from the  as you can.)  Cover your mouth and nose with a mask, tissue or washcloth.  Go straight to the testing site. Don't make any stops on the way there or back.    Starting now:     Do not go to work.  o If you receive a negative COVID-19 test result and were NOT exposed to someone with a known positive COVID-19 test, you can return to work once you're free of fever for 24 hours without fever-reducing medication and your symptoms are improving or resolved.  o If you receive a positive COVID-19 test result, you must be cleared by Employee Occupational Health and Safety to return to work.   o If you were exposed to someone who has tested positive for COVID-19, you can return to work 14 days after your last contact with the positive individual, provided you do not have symptoms at all during that time. In some cases, your manager may ask you to come back sooner than 14 days.     During this time, don't leave the house except for testing or medical care.  o Stay in your own room, even for meals. Use your own bathroom if you can.  o Stay away from others in your home. No hugging, kissing or shaking hands. No visitors.  o Don't go to work, school or anywhere else.    Clean \"high touch\" surfaces often (doorknobs, counters, handles, etc.). Use a household cleaning spray or wipes. You'll find a full list of  on the EPA website: " www.epa.gov/pesticide-registration/list-n-disinfectants-use-against-sars-cov-2.    Cover your mouth and nose with a mask, tissue or washcloth to avoid spreading germs.    Wash your hands and face often. Use soap and water.    People in these groups are at risk for severe illness due to COVID-19:  o People 65 years and older  o People who live in a nursing home or long-term care facility  o People with chronic disease (lung, heart, cancer, diabetes, kidney, liver, immunologic)  o People who have a weakened immune system, including those who:  - Are in cancer treatment  - Take medicine that weakens the immune system, such as corticosteroids  - Had a bone marrow or organ transplant  - Have an immune deficiency  - Have poorly controlled HIV or AIDS  - Are obese (body mass index of 40 or higher)  - Smoke regularly      Caregivers should wear gloves while washing dishes, handling laundry and cleaning bedrooms and bathrooms.    Use caution when washing and drying laundry: Don't shake dirty laundry, and use the warmest water setting that you can.    For more tips, go to www.cdc.gov/coronavirus/2019-ncov/downloads/10Things.pdf.    Sign up for [employer]. We know it's scary to hear that you might have COVID-19. We want to track your symptoms to make sure you're okay over the next 2 weeks. Please look for an email from [employer]--this is a free, online program that we'll use to keep in touch. To sign up, follow the link in the email you will receive. Learn more at [email or web address]    How can I take care of myself?    Get lots of rest. Drink extra fluids (unless a doctor has told you not to)    Take Tylenol (acetaminophen) for fever or pain. If you have liver or kidney problems, ask your family doctor if it's okay to take Tylenol.  Adults can take either:    650 mg (two 325 mg pills) every 4 to 6 hours, or     1,000 mg (two 500 mg pills) every 8 hours as needed.    Note: Don't take more than 3,000 mg in  one day. Acetaminophen is found in many medicines (both prescribed and over-the-counter medicines). Read all labels to be sure you don't take too much.  For children, check the Tylenol bottle for the right dose. The dose is based on the child's age or weight.    If you have other health problems (like cancer, heart failure, an organ transplant or severe kidney disease): Call your specialty clinic if you don't feel better in the next 2 days.    Know when to call 911. Emergency warning signs include:  Trouble breathing or shortness of breath  Pain or pressure in the chest that doesn't go away  Feeling confused like you haven't felt before, or not being able to wake up  Bluish-colored lips or face    Where can I get more information?     [employer] [facility] - About COVID-19: [email or web address]  CDC - What to Do If You're Sick: www.cdc.gov/coronavirus/2019-ncov/about/steps-when-sick.html

## 2021-06-14 ENCOUNTER — COMMUNICATION - HEALTHEAST (OUTPATIENT)
Dept: INTERNAL MEDICINE | Facility: CLINIC | Age: 31
End: 2021-06-14

## 2021-06-14 DIAGNOSIS — Z91.030 HISTORY OF BEE STING ALLERGY: ICD-10-CM

## 2021-06-15 NOTE — PROGRESS NOTES
Chief Complaint   Patient presents with     Menstrual Problem     Pt has been spotting more that often. Admit vomitting, headaches. Has been going on for 8x days.          HPI    Patient is here for the following issues:    #1. Irregular periods - her LMP started 12/25, ended 12/30. Eight days ago she started spotting followed by a few days of bleeding, but the last few days the bleeding still is lighter than her usual cycle. She is on Nuvarin x 1 yr. She is sexually active. No abdominal pain. Her periods had been typically very regular.     #2. Headache - x 3 days, moderate, constant, worst at right frontal when she lies down. Yesterday she had nausea and vomiting, but none today. She took Tylenol with minimal relief. She has hx of migraine, and her family also has extensive hx of migraine headaches. No cough, nasal symptoms, fever.     ROS: Pertinent ROS noted in [facility] [place].     Allergies   Allergen Reactions     Venomil Honey Bee Venom [Hymenoptera Allergenic Extract] Shortness Of Breath, Swelling and Rash     Sertraline Diarrhea     Chronic diarrhea and weight gain.  Went away when stopped.         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     Alcohol abuse Mother      Has history of excess, per patient.     Hypertension Father      Obesity Brother        Social History     Social History     Marital status: Single     Spouse name: N/A     Number of children: N/A     Years of education: N/A     Occupational History     Not on file.     Social History Main Topics     Smoking status: Never Smoker     Smokeless tobacco: Never Used     Alcohol use 1.8 oz/week     3 Cans  of beer per week      Comment: socially      Drug use: No     Sexual activity: Yes     Partners: Male     Birth control/ protection: Other     Other Topics Concern     Not on file     Social History Narrative         Objective:    Vitals:    01/23/18 0843   BP: 122/60   Pulse: 91   Resp: 14   Temp: 98.5  F (36.9  C)   SpO2: 98%       Gen: well appearing, no distress  Oropharynx: normal throat, oral mucosa  Ears: normal Tms and canals  Nose: traces of clear rhinorrhea  Eyes: normal conjunctiva, PERRLA, EOMI  Neck: NAD, supple  CV:RRR, normal S1S2, no M, R, G  Pulm: CTAB, normal effort  Abd: normal bowel sounds, soft, no pain, no mass.  Neuro: normal speech  Skin: dry, warm, no acute lesions.     Recent Results (from the past 24 hour(s))   Pregnancy, Urine   Result Value Ref [facility]    Pregnancy Test, Urine Negative Negative    Specific Gravity, UA 1.020 1.001 - 1.030       Impression:    #1. Irregular period    #2. Headache     Plan:    #1. Discussed hormonal etiologies. This appeared to be her first episode of bleeding outside of her period, and it has been light so I recommended no further investigation, and advised f/u with her PCP if bleeding persists within the next 7 days.     #2. Rule out migraine. No signs of intracranial pathology. OTC NSAIDs as directed.

## 2021-06-15 NOTE — TELEPHONE ENCOUNTER
Reason for call:  Other Patient called regarding    (reason for call): appointment    Additional comments: Pt called to schedule a follow-up with Dr. Padilla, last appointment was 5/2020. Dr. Padilla has not been with the organization for several months, caller was unaware. Writer unsure of a transition plan, instructed by nursing team to place encounter. Told caller to expect a call back from nurses.     Phone number to reach patient:  [phone]    Best Time:  ANY    Can we leave a detailed message on this number? YES

## 2021-06-15 NOTE — PROGRESS NOTES
Internal Medicine Office Visit  [facility]   Patient Name: Janny Kitchen  Patient Age: 30 y.o.  YOB: 1990  MRN: [id number]    Date of Visit: 3/10/2021  Reason for Office Visit:   Chief Complaint   Patient presents with     Medication Management           Assessment / Plan / Medical Decision Making:    Problem List Items Addressed This Visit     Migraine with aura and without status migrainosus, not intractable - Primary     - START: Imitrex as needed  - Counseled that an estrogen-free contraception option is preferred in the setting of migraine with aura. She will consider options and send a eTukTuk message   - Follow up annually if migraine frequency remains the same as it is currently          Relevant Medications    SUMAtriptan (IMITREX) 50 MG tablet      Other Visit Diagnoses     Changing nevus        Relevant Orders    Ambulatory referral to [facility]           I have discontinued Janny Kitchen's phentermine. I am also having her start on SUMAtriptan. Additionally, I am having her maintain her EPINEPHrine and etonogestreL-ethinyl estradioL. We will continue to administer azithromycin.          Orders Placed This Encounter   Procedures     Ambulatory referral to [facility]   Followup: Return in about 1 year (around 3/10/2022) for Annual physical or sooner if she choses Nexplanon or Mirena placement . earlier if needed.        Lisa Arredondo, DARRIUS        HPI:  Janny Kitchen is a 30 y.o. year old who presents to the office today for migraines. She has a history of migraines in college more regularly and then got better. More recently she has a migraine quarterly. Migraine symptoms include a shimmering in her vision with or without a headache. There is a family history of migraines. No known migraine triggers that she is aware of. With a recent migraine she had nausea and vomiting. She took Excedin, did  not have relief. A roommate has migraines and gave her Imitrex and she felt fantastic after taking this medication.     Birth control was reviewed. She is using Nuvaring currently.     There is a mole on the left side of the face which was initially a small, flat freckle and now it is bigger and three dimensional and has bled.     Health Maintenance Review  Health Maintenance   Topic Date Due     DEPRESSION ACTION PLAN  Never done     HEPATITIS C SCREENING  Never done     Pneumococcal Vaccine: Pediatrics (0 to 5 Years) and At-Risk Patients (6 to 64 Years) (1 of 4 - PCV13) Never done     HEPATITIS B VACCINES (3 of 3 - 3-dose primary series) 07/17/2003     HIV SCREENING  Never done     ADVANCE CARE PLANNING  Never done     PREVENTIVE CARE VISIT  05/29/2016     PAP SMEAR  03/05/2022     TD 18+ HE  06/11/2028     INFLUENZA VACCINE RULE BASED  Completed     TDAP ADULT ONE TIME DOSE  Completed       Current Scheduled Meds:  Outpatient Encounter Medications as of 3/10/2021   Medication Sig Dispense Refill     EPINEPHrine (EPIPEN/ADRENACLICK/AUVI-Q) 0.3 mg/0.3 mL injection Inject 0.3 mL (0.3 mg total) as directed as needed for anaphylaxis. Inject into thigh. 2 Pre-filled Pen Syringe 0     etonogestreL-ethinyl estradioL (NUVARING) 0.12-0.015 mg/24 hr vaginal ring Insert 1 each into the vagina every 21 days. Insert one (1) ring vaginally and leave in place for three (3) weeks, then remove for one (1) week. 3 each 4     SUMAtriptan (IMITREX) 50 MG tablet Take 1 tablet (50 mg total) by mouth daily as needed for migraine. May repeat in 2 hours if symptoms have not resolved 10 tablet 3     [DISCONTINUED] phentermine 8 mg Tab Take 8 mg by mouth 2 (two) times a day. 60 each 0     Facility-Administered Encounter Medications as of 3/10/2021   Medication Dose Route Frequency Provider Last Rate Last Admin     azithromycin powder 1 g (ZITHROMAX)  1 g Oral Once Martha Bryant PA-C               Objective / Physical  Examination:  Vitals:    03/10/21 1438   BP: 120/60   Pulse: 72   Weight: 201 lb (91.2 kg)     Wt Readings from Last 3 Encounters:   03/10/21 201 lb (91.2 kg)   09/28/20 191 lb (86.6 kg)   08/27/20 199 lb (90.3 kg)     Body mass index is 31.48 kg/m .     Constitutional: In no apparent distress  Skin: erythematous papular lesion left of nares

## 2021-06-15 NOTE — TELEPHONE ENCOUNTER
Patient was last seen almost a year ago 3-16-20 and did get a letter mailed out to her regarding NP Hallands departure.  Patient cancelled appointment 5-18-20 and never rescheduled.  PCP put in a referral yesterday for patient.    Spoke to patient and she really wants to follow NP Halland so will look at his current location.  She said she didn't want to schedule with another provider in our clinic at this time.

## 2021-06-16 PROBLEM — G43.109 MIGRAINE WITH AURA AND WITHOUT STATUS MIGRAINOSUS, NOT INTRACTABLE: Status: ACTIVE | Noted: 2021-03-10

## 2021-06-16 NOTE — PROGRESS NOTES
IUD Insertion Procedure Note    Indications: contraception    Procedure Details   Urine pregnancy test was done and result was negative.  The risks (including infection, bleeding, pain, and uterine perforation) and benefits of the procedure were explained to the patient and Written informed consent was obtained.      Cervix cleansed with Betadine. Uterus sounded to 8 cm. IUD inserted without difficulty. String visible and trimmed to approximately 3 cm. Patient tolerated procedure well.    IUD Information:  Mirena, Lot # [id number], Expiration date June 2023. NDC 50996-284-18    Condition:  Stable    Complications:  None    Plan:    The patient was advised to call for any fever or for prolonged or severe pain or bleeding. She was advised to use NSAID as needed for mild to moderate pain.   Return in 4 weeks for string check

## 2021-06-16 NOTE — PROGRESS NOTES
[facility] Clinic Note  Patient Name: Janny Kitchen  Patient Age: 27 y.o.  YOB: 1990  MRN: [id number]  ?  Date of Visit: 3/5/2018  Reason for Office Visit:   Chief Complaint   Patient presents with     Vaginitis     itching and discharge would like to get tested for STD. no current sx for that joelle       HPI: Janny Kitchen 27 y.o. female who presents to clinic for possible yeast infection, discomfort, itching, some white discharge, x 3 days. Has not tried any OTC treatment. She just finished her menstrual cycle. She would also like GC and syphilis screening today. [facility] and had HIV screening this year. She is not high risk    Review of Systems: As noted in HPI     Current Scheduled Meds:  Outpatient Encounter Prescriptions as of 3/5/2018   Medication Sig Dispense Refill     DIPHENHYDRAMINE HCL (BENADRYL ALLERGY ORAL) Take by mouth.       EPINEPHrine (EPIPEN) 0.3 mg/0.3 mL atIn Inject into thigh for severe allergic reaction.  May repeat in 15 mins if needed.  Pharmacist:  Please read note. 2 Pre-filled Pen Syringe 5     etonogestrel-ethinyl estradiol (NUVARING) 0.12-0.015 mg/24 hr vaginal ring Insert 1 each into the vagina every 28 days. Insert vaginally and leave in place for 3 consecutive weeks, then remove for 1 week.       buPROPion (WELLBUTRIN) 100 MG tablet Take 1 tablet (100 mg total) by mouth 2 (two) times a day. 180 tablet 0     citalopram (CELEXA) 20 MG tablet TAKE 1 TABLET BY MOUTH DAILY 30 tablet 0     No facility-administered encounter medications on file as of 3/5/2018.        Objective / Physical Examination:  /70 (Patient Site: Right Arm, Patient Position: Sitting, Cuff Size: Adult Regular)  Pulse 89  Temp 97.7  F (36.5  C) (Oral)   Wt 190 lb 1.6 oz (86.2 kg)  LMP 02/27/2018  BMI 31.15 kg/m2  Wt Readings from Last 3 Encounters:   03/05/18 190 lb 1.6 oz (86.2 kg)   01/23/18 188 lb (85.3 kg)   07/25/17 188 lb (85.3 kg)      Body mass index is 31.15 kg/(m^2). (>25?)    General Appearance: Alert and oriented in no acute distress  Pelvic: thick white discharge, no lesions, wet prep and GC collected    Assessment / Plan / Medical Decision Making:      Encounter Diagnoses   Name Primary?     Vaginal discharge Yes     Routine screening for STI (sexually transmitted infection)         1. Vaginal discharge  - Wet Prep, Vaginal    2. Routine screening for STI (sexually transmitted infection)  - Chlamydia trachomatis & Neisseria gonorrhoeae, Amplified Detection  - Syphilis Screen, [place] ([facility])    Yeast seen on wet prep - diflucan 150 mg one time dose. Will follow up other results    uNno Haro MD  [facility]

## 2021-06-17 NOTE — PATIENT INSTRUCTIONS - HE
Patient Instructions by Martha Bryant PA-C at 1/20/2020  8:20 AM     Author: Martha Bryant PA-C Service: -- Author Type: Physician Assistant    Filed: 1/20/2020  9:11 AM Encounter Date: 1/20/2020 Status: Signed    : Martha Bryant PA-C (Physician Assistant)         Patient Education     When You Have a Sore Throat    A sore throat can be painful. There are many reasons why you may have a sore throat. Your healthcare provider will work with you to find the cause of your sore throat. He or she will also find the best treatment for you.  What causes a sore throat?  Sore throats can be caused or worsened by:    Cold or flu viruses    Bacteria    Irritants such as tobacco smoke or air pollution    Acid reflux  A healthy throat  The tonsils are on the sides of the throat near the base of the tongue. They collect viruses and bacteria and help fight infection. The throat (pharynx) is the passage for air. Mucus from the nasal cavity also moves down the passage.  An inflamed throat  The tonsils and pharynx can become inflamed due to a cold or flu virus. Postnasal drip (excess mucus draining from the nasal cavity) can irritate the throat. It can also make the throat or tonsils more likely to be infected by bacteria. Severe, untreated tonsillitis in children or adults can cause a pocket of pus (abscess) to form near the tonsil.  Your evaluation  A medical evaluation can help find the cause of your sore throat. It can also help your healthcare provider choose the best treatment for you. The evaluation may include a health history, physical exam, and diagnostic tests.  Health history  Your healthcare provider may ask you the following:    How long has the sore throat lasted and how have you been treating it?    Do you have any other symptoms, such as body aches, fever, or cough?    Does your sore throat recur? If so, how often? How many days of school or work have you missed because of a sore  throat?    Do you have trouble eating or swallowing?    Have you been told that you snore or have other sleep problems?    Do you have bad breath?    Do you cough up bad-tasting mucus?  Physical exam  During the exam, your healthcare provider checks your ears, nose, and throat for problems. He or she also checks for swelling in the neck, and may listen to your chest.  Possible tests  Other tests your healthcare provider may perform include:    A throat swab to check for bacteria such as streptococcus (the bacteria that causes strep throat)    A blood test to check for mononucleosis (a viral infection)    A chest X-ray to rule out pneumonia, especially if you have a cough  Treating a sore throat  Treatment depends on many factors. What is the likely cause? Is the problem recent? Does it keep coming back? In many cases, the best thing to do is to treat the symptoms, rest, and let the problem heal itself. Antibiotics may help clear up some bacterial infections. For cases of severe or recurring tonsillitis, the tonsils may need to be removed.  Relieving your symptoms    Dont smoke, and stay away from secondhand smoke.    For children, try throat sprays or frozen ice pops. Adults and older children may try lozenges.    Drink warm liquids to soothe the throat and help thin mucus. Stay away from alcohol, spicy foods, and acidic drinks such as orange juice. These can irritate the throat.    Gargle with warm saltwater (1 teaspoon of salt to 8 ounces of warm water).    Use a humidifier to keep air moist and relieve throat dryness.    Try over-the-counter pain relievers such as acetaminophen or ibuprofen. Use as directed, and dont exceed the recommended dose. Dont give aspirin to children under age17.    Are antibiotics needed?  If your sore throat is due to a bacterial infection, antibiotics may speed healing and prevent complications. Although group A streptococcus (strep throat) is the major treatable infection for a sore  throat, strep throat causes only 5% to 15% of sore throats in adults who seek medical care. Most sore throats are caused by cold or flu viruses. And antibiotics dont treat viral illness. In fact, using antibiotics when theyre not needed may lead to bacteria that are harder to kill. Your healthcare provider will prescribe antibiotics only if he or she thinks they are likely to help.  If antibiotics are prescribed  Take the medicine exactly as directed. Be sure to finish your prescription even if youre feeling better. Ask your healthcare provider or pharmacist what side effects are common and what to do about them.  Is surgery needed?  In some cases, tonsils need to be removed. This is often done as outpatient (same-day) surgery. Your healthcare provider may advise removing the tonsils in cases of:    Several severe bouts of tonsillitis in a year. Severe episodes include those that lead to missed days of school or work, or that need to be treated with antibiotics.    Tonsillitis that causes breathing problems during sleep    Tonsillitis caused by food particles collecting in pouches in the tonsils (cryptic tonsillitis)  When to call your healthcare provider  Call your healthcare provider immediately if any of the following occur:    Problems swallowing    Symptoms worsen, or new symptoms develop.    Swollen tonsils make breathing difficult.    The pain is severe enough to keep you from drinking liquids.    If a skin rash or hives, develops, call your healthcare provider immediately. Any of these could signal an allergic reaction to antibiotics.    Symptoms dont improve within a week.    Symptoms dont improve within 2 to 3 days of starting antibiotics.  Call 911  Call 911 if any of the following occur:    Trouble breathing or problems catching your breath may be a medical emergency.    Skin is blue, purple or gray in color    Trouble talking    Feeling dizzy or faint    Feeling of doom  Date Last Reviewed: 7/1/2019     4098-6164 [employer]. [address] [place] [address], [place] [address]. All rights reserved. This information is not intended as a substitute for professional medical care. Always follow your healthcare professional's instructions.           Patient Education     Self-Care for Sore Throats    Sore throats happen for many reasons, such as colds, allergies, cigarette smoke, air pollution, and infections caused by viruses or bacteria. In any case, your throat becomes red and sore. Your goal for self-care is to reduce your discomfort while giving your throat a chance to heal.  Moisten and soothe your throat  Tips include the following:    Try a sip of water first thing after waking up.    Keep your throat moist by drinking 6 or more glasses of clear liquids every day.    Run a cool-air humidifier in your room overnight.    Avoid cigarette smoke.     If air pollution gives you a sore throat, check the air quality index and, on high pollution days, try to limit outdoor time.    Suck on throat lozenges, cough drops, hard candy, ice chips, or frozen fruit-juice bars. Use the sugar-free versions if your diet or medical condition requires them.  Gargle to ease irritation  Gargling every hour or 2 can ease irritation. Try gargling with 1 of these solutions:    1/4 teaspoon of salt in 1/2 cup of warm water    An over-the-counter anesthetic gargle  Use medicine for more relief  Over-the-counter medicine can reduce sore throat symptoms. Ask your pharmacist if you have questions about which medicine to use and, to prevent possible drug interactions, be certain to let the pharmacist know what medications you take. To decrease symptoms:    Ease pain with anesthetic sprays. Aspirin or an aspirin substitute also helps. Remember, never give aspirin to anyone 18 or younger, or if you are already taking blood thinners.     For sore throats caused by allergies, try antihistamines to block the allergic reaction.    Remember: unless  a sore throat is caused by a bacterial infection, antibiotics wont help you.  Prevent future sore throats  Prevention tips include the following:    Stop smoking or reduce contact with secondhand smoke. Smoke irritates the tender throat lining.    Limit contact with pets and with allergy-causing substances, such as pollen and mold.    When youre around someone with a sore throat or cold, wash your hands often to keep viruses or bacteria from spreading.    Limit outdoor time when air pollution particulates are high    Dont strain your vocal cords.  Contact your healthcare provider if you have:    A temperature over 101 F (38.3 C)    White spots on the throat    Great difficulty swallowing    Trouble breathing    A skin rash    Recent exposure to someone else with strep bacteria    Severe hoarseness and swollen glands in the neck or jaw  Date Last Reviewed: 8/1/2016 2000-2019 [employer]. [address] [place] [address]. All rights reserved. This information is not intended as a substitute for professional medical care. Always follow your healthcare professional's instructions.

## 2021-06-17 NOTE — TELEPHONE ENCOUNTER
Telephone Encounter by Ant Yi CMA at 2/26/2021  2:15 PM     Author: Ant Yi CMA Service: -- Author Type: Certified Medical Assistant    Filed: 2/26/2021  2:15 PM Encounter Date: 2/26/2021 Status: Signed    : Ant Yi CMA (Certified Medical Assistant)       Janny [facility] Internal Medicine Support Pool   Phone Number: [phone]             Vasiliy Gonzalez,     I had been seeing Hailey Padilla at [facility] for psychiatry and medication for the past few years but fell out of sync with my appointments with him this year since I am not currently on any medications.. I need an order from you to be sent to his office to get back on track with those appointments. Would you be able to send an order for psychiatry care for me to Hailey's office so that I could go ahead and get a follow-up appointment scheduled?     Thanks so much!      Order has been pended. Please review and sign.

## 2021-06-17 NOTE — PROGRESS NOTES
Internal Medicine Office Visit  [facility]   Patient Name: Janny Kitchen  Patient Age: 31 y.o.  YOB: 1990  MRN: [id number]    Date of Visit: 5/13/2021  Reason for Office Visit:   Chief Complaint   Patient presents with     Follow-up           Assessment / Plan / Medical Decision Making:    Problem List Items Addressed This Visit     BMI 31.0-31.9,adult     Reviewed options including referral to bariatric clinic, Weight Watchers, Noom application, visit to dietician. She will look into the dietician at [facility] where her insurance indicated coverage  - START: wellbutrin 150 mg two times a day and naltrexone 50 mg daily  - Follow up in 2 months for weight check, LFTs         Relevant Medications    naltrexone (DEPADE) 50 mg tablet    buPROPion (WELLBUTRIN SR) 150 MG 12 hr tablet    Major depressive disorder, recurrent, in partial remission (H)     Doing well from a mood standpoint  Bupropion may have some benefit for mood as well as weight loss, will repeat PHQ9 at next visit          Relevant Medications    buPROPion (WELLBUTRIN SR) 150 MG 12 hr tablet      Other Visit Diagnoses     Routine screening for STI (sexually transmitted infection)    -  Primary    Relevant Orders    Chlamydia trachomatis & Neisseria gonorrhoeae, Amplified Detection    Cervical cancer screening        Relevant Orders    Gynecologic Cytology (PAP Smear)    IUD check up        no signs of migration            I am having Janny Kitchen start on naltrexone and buPROPion. I am also having her maintain her SUMAtriptan, EPINEPHrine, and levonorgestreL. We will continue to administer azithromycin.            Orders Placed This Encounter   Procedures     Chlamydia trachomatis & Neisseria gonorrhoeae, Amplified Detection   Followup: Return in about 2 months (around 7/13/2021) for Recheck. earlier if needed.        Lisa Arredondo, CNP        HPI:  Janny Kitchen is a 31 y.o. year old who presents to the office  today for an IUD string check. She has been able to feel the strings. She is having some spotting.     We reviewed her BMI of 31.32. She talked with a weight loss clinic to get ideas on how to lose weight in a healthy manner. She asks about naltrexone and bupropion. Would prefer to avoid a controlled substance.       Health Maintenance Review  Health Maintenance   Topic Date Due     DEPRESSION ACTION PLAN  Never done     HEPATITIS C SCREENING  Never done     Pneumococcal Vaccine: Pediatrics (0 to 5 Years) and At-Risk Patients (6 to 64 Years) (1 of 4 - PCV13) Never done     COVID-19 Vaccine (1) Never done     HEPATITIS B VACCINES (3 of 3 - 3-dose primary series) 07/17/2003     HIV SCREENING  Never done     ADVANCE CARE PLANNING  Never done     PREVENTIVE CARE VISIT  05/29/2016     PAP SMEAR  03/05/2022     TD 18+ HE  06/11/2028     INFLUENZA VACCINE RULE BASED  Completed     TDAP ADULT ONE TIME DOSE  Completed       Current Scheduled Meds:  Outpatient Encounter Medications as of 5/13/2021   Medication Sig Dispense Refill     EPINEPHrine (EPIPEN/ADRENACLICK/AUVI-Q) 0.3 mg/0.3 mL injection Inject 0.3 mL (0.3 mg total) as directed as needed for anaphylaxis. Inject into thigh. 2 Pre-filled Pen Syringe 0     levonorgestreL (MIRENA) 20 mcg/24 hours (6 yrs) 52 mg IUD 1 each by Intrauterine route once. Placed 3/29/2021       SUMAtriptan (IMITREX) 50 MG tablet Take 1 tablet (50 mg total) by mouth daily as needed for migraine. May repeat in 2 hours if symptoms have not resolved 10 tablet 3     buPROPion (WELLBUTRIN SR) 150 MG 12 hr tablet Take 1 tablet (150 mg total) by mouth 2 (two) times a day. 60 tablet 1     naltrexone (DEPADE) 50 mg tablet Take 1 tablet (50 mg total) by mouth daily. 30 tablet 1     Facility-Administered Encounter Medications as of 5/13/2021   Medication Dose Route Frequency Provider Last Rate Last Admin     azithromycin powder 1 g (ZITHROMAX)  1 g Oral Once Martha Bryant PA-C              Objective / Physical Examination:  Vitals:    05/13/21 1336   BP: 112/64   Pulse: 70   Weight: 200 lb (90.7 kg)     Wt Readings from Last 3 Encounters:   05/13/21 200 lb (90.7 kg)   03/29/21 201 lb (91.2 kg)   03/10/21 201 lb (91.2 kg)     Body mass index is 31.32 kg/m .     Constitutional: In no apparent distress  Genital: EXTERNAL GENITALIA: Normal appearing vulva without masses, tenderness or lesions. PERINEUM: normal and intact. URETHRAL MEATUS: normal VAGINA:  vagina with normal color and without discharge or lesions. CERVIX: normal appearing cervix without discharge or lesions. Non-friable. IUD strings visualized

## 2021-06-17 NOTE — TELEPHONE ENCOUNTER
RN cannot approve Refill Request    RN can NOT refill this medication med is not covered by policy/route to provider. Last office visit: 5/13/2021 Lisa Arredondo FNP Last Physical: Visit date not found Last MTM visit: Visit date not found Last visit same specialty: 5/13/2021 Lisa Arredondo FNP.  Next visit within 3 mo: Visit date not found  Next physical within 3 mo: Visit date not found      Livia Lawrence, [facility] Triage/Med Refill 5/17/2021    Requested Prescriptions   Pending Prescriptions Disp Refills     EPINEPHrine (EPIPEN/ADRENACLICK/AUVI-Q) 0.3 mg/0.3 mL injection [Pharmacy Med Name: EPINEPHRINE 0.3 MG AUTO-INJ 0.3 Solution Auto-injector] 2 each 0     Sig: INJECT 0.3 ML (0.3 MG TOTAL) AS DIRECTED AS NEEDED FOR ANAPHYLAXIS. INJECT INTO THIGH.       There is no refill protocol information for this order

## 2021-06-18 NOTE — PROGRESS NOTES
"Internal Medicine Office Visit  [facility]  Patient Name: Janny Kitchen  Patient Age: 28 y.o.  YOB: 1990  MRN: [id number]    Date of Visit: 2018  Reason for Office Visit:   Chief Complaint   Patient presents with     Establish Care           Assessment / Plan / Medical Decision Makin. History of bee sting allergy  -Refilled EpiPen.  She is advised to keep this with her at all times.    2. SHERLY (generalized anxiety disorder)   -Currently controlled with regular psychology visits.  She declines starting any additional medication at this time.  Follow-up as needed    3. Impetigo  - Regarding skin outbreaks, recommend that she is seen during an active outbreak to do a culture to r/o HSV vs impetigo    Asked today that she have her Pap smear results sent to the office to put in her file.  Tdap vaccine updated today  Health Maintenance Review  Health Maintenance   Topic Date Due     ADVANCE DIRECTIVES DISCUSSED WITH PATIENT  2008     PAP SMEAR  2011     INFLUENZA VACCINE RULE BASED (Season Ended) 2018     DEPRESSION FOLLOW UP  2018     TD 18+ HE  2028     TDAP ADULT ONE TIME DOSE  Completed         I have discontinued Ms. Kitchen's EPINEPHrine, citalopram, and buPROPion. I am also having her start on EPINEPHrine. Additionally, I am having her maintain her DIPHENHYDRAMINE HCL (BENADRYL ALLERGY ORAL), etonogestrel-ethinyl estradiol, and mupirocin.      HPI:  Janny Kitchen is a 28 y.o. year old who presents to the office today to establish care.     Depression/anxiety- had more anxiety than depression. She states that she \"sucks at taking medications\" and thus stopped taking citalopram and bupropion. She works a very stressful job but her living situation is better. Work and finances are better. She sees a phychologist biweekly for the past 2 years.  At the present time she is not interested in restarting any medications.    We " reviewed a recent outbreak and treatment for impetigo. The topical treatment was ineffective and she was later started on oral therapy. She recently had another outbreak after she was out in the sun.  She has not had cultures of these lesions.    Hx of tallor dome injury left foot- She was referred to orthopedics. Surgery was recommended, she does not have time to take time off of work for this currently.  Pain has not worsened or changed.    She does have a history of an anaphylactic response to bee stings.  She carries an EpiPen, her current plan is newly .    She currently has well woman checks with Pap smears and prescribed birth control done through [facility].    Review of Systems- see complete ROS on Health history form scanned into chart         Current Scheduled Meds:  Outpatient Encounter Prescriptions as of 2018   Medication Sig Dispense Refill     DIPHENHYDRAMINE HCL (BENADRYL ALLERGY ORAL) Take by mouth.       etonogestrel-ethinyl estradiol (NUVARING) 0.12-0.015 mg/24 hr vaginal ring Insert 1 each into the vagina every 28 days. Insert vaginally and leave in place for 3 consecutive weeks, then remove for 1 week.       mupirocin (BACTROBAN) 2 % ointment APPLY TO AFFECTED AREA THREE TIMES DAILY FOR 7 DAYS  0     [DISCONTINUED] buPROPion (WELLBUTRIN) 100 MG tablet Take 1 tablet (100 mg total) by mouth 2 (two) times a day. 180 tablet 0     [DISCONTINUED] citalopram (CELEXA) 20 MG tablet TAKE 1 TABLET BY MOUTH DAILY 30 tablet 0     [DISCONTINUED] EPINEPHrine (EPIPEN) 0.3 mg/0.3 mL atIn Inject into thigh for severe allergic reaction.  May repeat in 15 mins if needed.  Pharmacist:  Please read note. 2 Pre-filled Pen Syringe 5     EPINEPHrine (EPIPEN/ADRENACLICK) 0.3 mg/0.3 mL injection Inject 0.3 mL (0.3 mg total) as directed as needed for anaphylaxis. Inject into thigh. 2 Pre-filled Pen Syringe 0     No facility-administered encounter medications on file as of 2018.      History  reviewed. No pertinent past medical history.  Past Surgical History:   Procedure Laterality Date     lioma       LIPOMA RESECTION      back      Social History   Substance Use Topics     Smoking status: Never Smoker     Smokeless tobacco: Never Used     Alcohol use 1.8 oz/week     3 Cans of beer per week      Comment: socially        Objective / Physical Examination:  Vitals:    06/11/18 1015   BP: 114/62   Pulse: 70   Weight: 183 lb (83 kg)     Wt Readings from Last 3 Encounters:   06/11/18 183 lb (83 kg)   05/23/18 184 lb 3.2 oz (83.6 kg)   03/05/18 190 lb 1.6 oz (86.2 kg)     Body mass index is 29.99 kg/(m^2).     General Appearance: Alert and oriented, cooperative, affect appropriate, speech clear, in no apparent distress  Ears: Tympanic membrane clear with landmarks well visualized bilaterally  Eyes: PERRL  Nose: Septum midline, nares patent  Throat: Lips and mucosa moist. Teeth in good repair, pharynx without erythema or exudate  Neck: Supple, trachea midline. No cervical adenopathy  Lungs: Clear to auscultation bilaterally. Normal inspiratory and expiratory effort  Cardiovascular: Regular rate, normal S1, S2. No murmurs, rubs, or gallops  Integumentary: Warm and dry. No facial lesions/rash   Psych: Alert and oriented x3. Does not appear anxious/depressed     Orders Placed This Encounter   Procedures     Tdap vaccine,  6yo or older,  IM   Followup: Return in about 1 year (around 6/11/2019) for Annual physical. earlier if needed.        Lisa Arredondo, CNP

## 2021-06-18 NOTE — PROGRESS NOTES
[facility] Clinic Note  Patient Name: Janny Kitchen  Patient Age: 28 y.o.  YOB: 1990  MRN: [id number]  ?  Date of Visit: 5/23/2018  Reason for Office Visit:   Chief Complaint   Patient presents with     Rash     on face for 2 months went to the urgency room they dx her with impteigo but its not clearing up with cream they gave her.       HPI: Janny Kitchen 28 y.o. who presents to clinic for rash around nose and mouth started around 2 months ago, was seen at urgent care and given Bactroban as they thought it was impetigo. She used it for about 3-4 weeks without much improvement. It has not gotten much worse but has not gone away. Does not itch. No new topical creams/lotions, makeup.       Review of Systems: As noted in HPI     Current Scheduled Meds:  Outpatient Encounter Prescriptions as of 5/23/2018   Medication Sig Dispense Refill     DIPHENHYDRAMINE HCL (BENADRYL ALLERGY ORAL) Take by mouth.       EPINEPHrine (EPIPEN) 0.3 mg/0.3 mL atIn Inject into thigh for severe allergic reaction.  May repeat in 15 mins if needed.  Pharmacist:  Please read note. 2 Pre-filled Pen Syringe 5     etonogestrel-ethinyl estradiol (NUVARING) 0.12-0.015 mg/24 hr vaginal ring Insert 1 each into the vagina every 28 days. Insert vaginally and leave in place for 3 consecutive weeks, then remove for 1 week.       mupirocin (BACTROBAN) 2 % ointment APPLY TO AFFECTED AREA THREE TIMES DAILY FOR 7 DAYS  0     buPROPion (WELLBUTRIN) 100 MG tablet Take 1 tablet (100 mg total) by mouth 2 (two) times a day. 180 tablet 0     cephalexin (KEFLEX) 250 MG capsule Take 1 capsule (250 mg total) by mouth 4 (four) times a day for 5 days. 20 capsule 0     citalopram (CELEXA) 20 MG tablet TAKE 1 TABLET BY MOUTH DAILY 30 tablet 0     No facility-administered encounter medications on file as of 5/23/2018.        Objective / Physical Examination:  /62 (Patient Site: Right Arm, Patient Position: Sitting, Cuff Size: Adult Regular)   Pulse 91  Temp 98.2  F (36.8  C) (Oral)   Wt 184 lb 3.2 oz (83.6 kg)  LMP 04/25/2018  SpO2 98%  BMI 30.19 kg/m2  Wt Readings from Last 3 Encounters:   05/23/18 184 lb 3.2 oz (83.6 kg)   03/05/18 190 lb 1.6 oz (86.2 kg)   01/23/18 188 lb (85.3 kg)     Body mass index is 30.19 kg/(m^2). (>25?)    General Appearance: Alert and oriented in no acute distress  Integumentary: erythematous rash around perinasal area with small pustule and crusting around mouth.    Neuro: Alert and oriented, follows commands appropriately.    Assessment / Plan / Medical Decision Making:      Encounter Diagnoses   Name Primary?     Impetigo Yes        1. Impetigo    clinically appears to be elisa nasal impetigo that has spread. With ongoing rash not improving with topical mupirocin will try a short course of keflex and see if this clears it up. It does not appear to be herpes but could consider this if it does not clear up, or send to derm     - cephalexin (KEFLEX) 250 MG capsule; Take 1 capsule (250 mg total) by mouth 4 (four) times a day for 5 days.  Dispense: 20 capsule; Refill: 0    rtc if not improving    Total time spent with patient was 15 minutes with >50% of time spent in face-to-face counseling regarding the above plan     Nuno Haro MD  [facility]

## 2021-06-20 NOTE — LETTER
Letter by Helen Bentley RN at      Author: Helen Bentley RN Service: -- Author Type: --    Filed:  Encounter Date: 9/11/2020 Status: (Other)       September 11, 2020              Janny BARNES Fidelina  [address] [place] [facility] [address]      Dear Ms. Kitchen:    Im writing to inform you that Hailey Padilla CNP will be leaving [facility] on September 28, 2020.  We apologize for this disruption in your care and we are committed to supporting your treatment needs. If you have follow-up appointments after September 28, 2020, we will connect you with another mental health provider within our system.  For medication refills, please contact your pharmacy and they will connect with us to initiate the refill process.     To schedule an appointment with Hailey Padilla CNP before September 28, 2020 please call [facility] at [phone]. If you do not plan to return for ongoing medication management at this time, please let us know that as well, so we can note that in your medical record.     Again, we apologize for the inconvenience and look forward to continuing to provide you with the best possible care.    Sincerely,        Electronically signed by Helen Bentley RN   Mental Health and Addiction Clinic   [facility]   [address] [place] [address]   [phone]

## 2021-06-20 NOTE — PROGRESS NOTES
Mental Health Visit Note    9/11/2018    Start time: 1000    Stop Time: 1045   Session # 1    Session Type: Patient is presenting for an Individual session.    Janny Kitchen is a 28 y.o. female is being seen today for    Chief Complaint   Patient presents with     Intake     New symptoms or complaints: Patient indicated wanting medication for depression and anxiety.     Functional Impairment:   Personal: 3  Family: 3  Work: 3  Social:3    Clinical assessment of mental status: Janny Kitchen presented on time for scheduled session.  She was open and cooperative, and dressed appropriately for this session and weather. Her memory was good for short and long term.  Her  speech and language was soft and concise while tearful at times.  Concentration and focus is within normal limits. Psychosis is not noted or reported. She reports her mood is sad and anxious.  Affect is congruent with speech.  Fund of knowledge is adequate. Insight is adequate for therapy.    Suicidal/Homicidal Ideation present: None Reported This Session    Patient's impression of their current status: Patient presented for the first part of an intake to get an appointment with medication management. Patient indicated she see's a therapist Pita Morales at [facility]. An MERYL has been signed and this therapist has requested patient's DA. Due to this DA is not need for this patient. Patient reported she has been struggling with depression and anxiety for many years. Patient indicated she first seen a therapist roughly 5 years ago and was on medication shortly after. Patient reported she has always received medication from her primary doctor and has not had much success. Patient indicated she has not been on medications now for over a year. Patient reported the medication would not work or the side affects of weight gain and low libido would make her stop the mediations. Patient indicated she is hoping to find a medication that works and does not have  these side effects.     Patient reported she struggles with anxiety including feeling anxious, worrying, racing thoughts, tight chest, hard on herself and feeling restless. Patient indicated she struggles with depression including depressed mood, decreased interests in pleasurable activities, sleeping too much, feeling unhappy, lack of motivation, low self-esteem, frequent crying spells, guilt and feeling os worthlessness. Patient denied current suicidal ideation, plans and/or means.     Therapist impression of patients current state: This therapist went over informed consent which patient agreed and signed. This therapist gathered information and had patient sign an MERYL for her current therapist. This therapist will not meet with patient again due to patient having a therapist and just needing medication management.     Type of psychotherapeutic technique provided: Insight oriented, Client centered and CBT    Progress toward short term goals:Progress as expected with patient coming to scheduled session.     Review of long term goals: Not done at today's visit    Diagnosis:   1. SHERLY (generalized anxiety disorder)     2. Major depressive disorder, recurrent episode, moderate (H)        Plan and Follow up: Patient is scheduled to meet with Hailey Padilla CNP on 10/15/2018. Patient will not meet with this therapist again and will continue with her current therapist.     Discharge Criteria/Planning: Patient will be discharged from this therapist due to patient having a therapist in the community.     Xiomara Lepe 9/11/2018

## 2021-06-21 NOTE — PROGRESS NOTES
Patient here today to initiate psychiatric care. Pt had made this appt for about a month and was at a low time in life and now states she is doing better. Pt would like to discuss medications.      Anxiety: 3/5  Depression: 3/5    Pt denies both SI/HI    MNPMP    None      Correct pharmacy verified with patient and confirmed in snapshot? [x] yes []no    Charge captured ? [x] yes  [] no    Medications Phoned  to Pharmacy [] yes [x]no  Name of Pharmacist:  List Medications, including dose, quantity and instructions      Medication Prescriptions given to patient   [] yes  [x] no   List the name of the drug the prescription was written for.       Medications ordered this visit were e-scribed.  Verified by order class [x] yes  [] no    Medication changes or discontinuations were communicated to patient's pharmacy: [] yes  [x] no    UA collected [] yes  [x] no    [place] Prescription Monitoring Program Reviewed? [x] yes  [] no    Referrals were made to:      Future appointment was made: [x] yes  [] no    Dictation completed at time of chart check: [] yes  [x] no    I have checked the documentation for today s encounters and the above information has been reviewed and completed.

## 2021-06-22 NOTE — TELEPHONE ENCOUNTER
Note from pharmacy:  Pt has no insurance for the month of January. Fluoxetine tabs would cost pt $90 for one month ( capsules only $20). Pt requesting capsules ( new prescription) for the month of January if appropriate. Thank you    Rx for Prozac tabs approved in October ( see below)    FLUoxetine (PROZAC) 20 MG tablet 30 tablet 5 10/15/2018  --   Sig - Route: Take 1 tablet (20 mg total) by mouth daily. - Oral     F/U: 2/4/19  Cancel: 1/7/19 and 1/14/19 - no insurance, out of town  Last seen: 10/15/18 for initial consult    Order for Prozac 20 mg cap pended for provider to review and sign

## 2021-06-22 NOTE — TELEPHONE ENCOUNTER
Replaced with caps due to cost ( see Rx below)    FLUoxetine (PROZAC) 20 MG capsule 30 capsule 11 1/6/2019  --   Sig - Route: Take 1 capsule (20 mg total) by mouth daily. - Oral   Sent to pharmacy as: FLUoxetine (PROZAC) 20 MG capsule

## 2021-06-24 NOTE — PROGRESS NOTES
Patient here today for follow up of medication management. States depression 2/5, anxiety 4/5. States sleeps about 8-12 hours a night, states she is up for about 2 hours in the middle of the night since starting the prozac but then oversleeping the morning.   PHQ-9  SHERLY-7  Nothing to report on [place]

## 2021-06-24 NOTE — TELEPHONE ENCOUNTER
"Patient called Triage today wanting provider, Hailey Padilla NP, to know about medication concerns. She reports that she has not started the Cymbalta yet. She has been doing research and is concerned about side effects. She said she is open but the info she read was \"daunting\"  "

## 2021-06-24 NOTE — TELEPHONE ENCOUNTER
I spoke with the patient on the phone.  She states reluctant to start duloxetine due to concern for side effects-especially post online that people are difficulty getting off duloxetine.  She previously had some difficulty when she missed doses of Effexor, and she thought this might be similar.    I reviewed options for care including continuing on fluoxetine without change, starting duloxetine and monitoring for side effects, or augmenting with bupropion.    Client agreeable to augmenting with bupropion.  I did review some of the side effects and risks of taking this medication with the client verbalizing understanding of these.  Prescription sent into patient's pharmacy.

## 2021-06-24 NOTE — PATIENT INSTRUCTIONS - HE
Please contact crisis or go to the emergency room if you have thoughts of self harm or suicide.  Take medication as prescribed. Please do not make changes or adjustments to your medications without talking to a health professional first.  Contact the pharmacy if you are out of medication and in need of a refill.    F/U 6-8 weeks.

## 2021-06-25 NOTE — PROGRESS NOTES
Correct pharmacy verified with patient and confirmed in snapshot? [x] yes []no    Charge captured ? [x] yes  [] no    Medications Phoned  to Pharmacy [] yes [x]no  Name of Pharmacist:  List Medications, including dose, quantity and instructions      Medication Prescriptions given to patient   [] yes  [x] no   List the name of the drug the prescription was written for.       Medications ordered this visit were e-scribed.  Verified by order class [x] yes  [] no    Medication changes or discontinuations were communicated to patient's pharmacy: [] yes  [] no    UA collected [] yes  [x] no    [place] Prescription Monitoring [place] Reviewed? [x] yes  [] no    Referrals were made to: none     Future appointment was made: [x] yes  [] no    Dictation completed at time of chart check: [x] yes  [] no    I have checked the documentation for today s encounters and the above information has been reviewed and completed.

## 2021-06-25 NOTE — PROGRESS NOTES
Chief Complaint   Patient presents with     Allergic Reaction     reaction since 7 last night, patient has taken benadryl three time- swollen lips and hives on body          HPI:    Patient is here for having a rash first started around 3 pm yesterday on her wrists, then spread to her body and lips as the day progressed. This morning her lip swelling got worse. There is some itching. She was eating popcorn at a theater at the time she started having the rash around her wrists. She had some wine around 5 pm. No other recent unusual contacts nor exposure. She denied throat swelling, coughing, wheezing, shortness of breath, difficulty swallowing. She has been taking Benadryl 3 times (each time 50 mg) without relief.    ROS: Pertinent ROS noted in HPI.     Allergies   Allergen Reactions     Venomil Honey Bee Venom [Hymenoptera Allergenic Extract] Shortness Of Breath, Swelling and Rash     Wasp Venom Anaphylaxis     Sertraline Diarrhea     Chronic diarrhea and weight gain.  Went away when stopped.         Patient Active Problem List   Diagnosis     History of bee sting allergy, anaphylaxis     SHERLY (generalized anxiety disorder)      Obesity (BMI 30.0-34.9)     Major depressive disorder, recurrent, in partial remission (H)       Family History   Problem Relation Age of Onset     Cancer Mother         skin cancer      Mental illness Mother         Patient speculates on undiagnosed emotional issues.     Alcohol abuse Mother         Has history of excess, per patient.     Hypertension Father      Heart disease Father      Obesity Brother         half brother     Diabetes type II Brother        Social History     Socioeconomic History     Marital status: Single     Spouse name: Not on file     Number of children: Not on file     Years of education: Not on file     Highest education level: Not on file   Occupational History     Not on file   Social Needs     Financial resource strain: Not on file     Food insecurity:      Worry: Not on file     Inability: Not on file     Transportation needs:     Medical: Not on file     Non-medical: Not on file   Tobacco Use     Smoking status: Never Smoker     Smokeless tobacco: Never Used   Substance and Sexual Activity     Alcohol use: Yes     Alcohol/week: 1.8 oz     Types: 3 Cans of beer per week     Comment: socially      Drug use: No     Sexual activity: Yes     Partners: Male     Birth control/protection: Other   Lifestyle     Physical activity:     Days per week: Not on file     Minutes per session: Not on file     Stress: Not on file   Relationships     Social connections:     Talks on phone: Not on file     Gets together: Not on file     Attends [employer]ism service: Not on file     Active member of club or organization: Not on file     Attends meetings of clubs or organizations: Not on file     Relationship status: Not on file     Intimate partner violence:     Fear of current or ex partner: Not on file     Emotionally abused: Not on file     Physically abused: Not on file     Forced sexual activity: Not on file   Other Topics Concern     Not on file   Social History Narrative     Not on file         Objective:    Vitals:    03/19/19 0719   BP: 126/84   Pulse: 95   Temp: 99.2  F (37.3  C)   SpO2: 98%       Gen:NAD  Oropharynx: normal without evidence of soft tissue edema.  CV: RRR, normal S1S2, no M, R, G  Pulm: CTAB, normal effort  Skin: moderate swelling of lips lower >upper without significant erythema. Large areas of 0.5 cm to 1 cm urticarial lesions on lower abdomen, lower back, hips and proximal thighs. Back, arms, distal legs, neck, face and scalp are clear of lesions.        Allergic reaction, initial encounter  -     methylPREDNISolone sod suc(PF) injection 125 mg (SOLU-MEDROL)  -     methylPREDNISolone (MEDROL DOSEPACK) 4 mg tablet; Follow package directions      No respiratory compromise. Patient has had three doses of Benadryl prior to arrival. With lip swelling still worsening,  will start Solumedrol here, and discharge home on Medrol dosepack, with close f/u in ER as directed.

## 2021-06-25 NOTE — PROGRESS NOTES
Progress Notes by Arya Ceja at 6/2/2017  9:00 AM     Author: Arya Ceja Service: -- Author Type: Nurse Practitioner    Filed: 6/2/2017 11:07 AM Encounter Date: 6/2/2017 Status: Signed    : [facility]    Date of Service: 6/2/2017  Primary Provider: Arya Ceja CNP    Patient Care Team:  Arya Ceja CNP as PCP - General (Nurse Practitioner)     ______________________________________________________________________     Patient's Pharmacy:    [facility] [address] [place] [id number]  Phone: [phone] Fax: [phone]     Patient's Insurance:    Payor: BLUE CROSS / Plan: BLUE PLUS MA / Product Type: PMAP /     ______________________________________________________________________    Assessment:    1. Encounter to establish care    2. Depression    3. Obesity (BMI 30.0-34.9)       ______________________________________________________________________      PHQ-2 Total Score: 3 (6/2/2017 10:00 AM)  PHQ-9 Total Score: 16 (6/2/2017 10:00 AM)     Plan:  Patient Instructions   1. Start citalopram (Celexa) 20 mg by mouth daily - may cut in half if experiencing increased GI side effects to see if this improves.  2. Continue counseling with Pita Morales, even after starting this medication.    CRISIS LINE: [phone]   [facility]   [address] [place] [address]    Call the crisis line for immediate mental health support, 24 hours a day.   Crisis line staff might recommend a visit to [facility], or connect you with a mobile crisis team in your community      If you or someone you know is experiencing a medical emergency dial 911      ______________________________________________________________________     Janny Kitchen is 27 y.o. female who comes in today for:    Chief Complaint   Patient presents with   ?  Establish Care     GO over mediation.        Patient Active Problem List   Diagnosis   ? 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 SHERLY (generalized anxiety disorder) - I question this diagnosis as of 2/5/16 - TWB   ? Depression   ? Obesity (BMI 30.0-34.9)   ? Contraceptive education     Current Outpatient Prescriptions   Medication Sig Note   ? DIPHENHYDRAMINE HCL (BENADRYL ALLERGY ORAL) Take by mouth. 5/17/2017: As needed   ? EPINEPHrine (EPIPEN) 0.3 mg/0.3 mL atIn Inject into thigh for severe allergic reaction.  May repeat in 15 mins if needed.  Pharmacist:  Please read note.    ? citalopram (CELEXA) 20 MG tablet Take 1 tablet (20 mg total) by mouth daily.      Social History     Social History   ? Marital status: Single     Spouse name: N/A   ? Number of children: N/A   ? Years of education: N/A     Occupational History   ? Not on file.     Social History Main Topics   ? Smoking status: Never Smoker   ? Smokeless tobacco: Never Used   ? Alcohol use 1.8 oz/week     3 Cans of beer per week      Comment: socially    ? Drug use: No   ? Sexual activity: Yes     Partners: Male     Birth control/ protection: Other     Other Topics Concern   ? Not on file     Social History Narrative     ______________________________________________________________________     History of present illness: Janny Kitchen is a pleasant 27 y.o. female who presents in clinic today to establish care and review medications.  She has been seeing LUX Abel in [place] for mental health counseling which she finds of benefit.  She has been on the Nuva Ring for birth control which she finds to be working well.  She would like to lose weight and has modified her diet and is beginning to return to the gym for exercise as time has allowed.  She works at a [employer] part-time and is a  full time.  She is single and has recently gotten out of a longer-term relationship.  She goes to [facility] for her Ob/Gyn health  "maintenance.      Review of systems:   10 point review of systems is negative unless noted in the HPI.    ______________________________________________________________________    Wt Readings from Last 3 Encounters:   06/02/17 191 lb (86.6 kg)   05/17/17 188 lb 14.4 oz (85.7 kg)   09/12/16 189 lb 6.4 oz (85.9 kg)     BP Readings from Last 3 Encounters:   06/02/17 120/78   05/17/17 110/70   09/12/16 122/78     /78  Pulse 84  Ht 5' 5.5\" (1.664 m)  Wt 191 lb (86.6 kg)  LMP 05/13/2017  BMI 31.3 kg/m2     Physical Exam:  General Appearance: Alert, cooperative, no distress, appears stated age  Head: Normocephalic, without obvious abnormality, atraumatic  Eyes: PERRL, conjunctiva/corneas clear, EOM's intact  Ears: Normal TM's and external ear canals, both ears  Nose: Nares normal, septum midline,mucosa normal, no drainage  Throat: Lips, mucosa, and tongue normal; teeth and gums normal, no erythema, exudate, or thrush  Neck: Supple, symmetrical, trachea midline, no adenopathy;  thyroid: not enlarged, symmetric, no tenderness/mass/nodules  Back: Symmetric, no curvature, ROM normal, no CVA tenderness  Lungs: Clear to auscultation bilaterally, respirations unlabored  Heart: Regular rate and rhythm, S1 and S2 normal, no murmur, rub, or gallop  Abdomen: Soft, non-tender, bowel sounds active all four quadrants,  no masses, no organomegaly  Musculoskeletal: Normal range of motion. No joint swelling or deformity.   Extremities: Extremities normal, atraumatic, no cyanosis or edema  Skin: Skin color, texture, turgor normal, no rashes or lesions  Lymph nodes: Cervical & supraclavicular nodes normal  Neurologic: She is alert & oriented x 3. She has normal gait.  Psychiatric: She has a normal mood and depressed affect, occasional tearful at times.       Arya Ceja [facility]  Internal Medicine  [facility]     Return in about 4 weeks (around 6/30/2017), or if symptoms worsen or fail to improve.        "

## 2021-06-25 NOTE — PROGRESS NOTES
Progress Notes by Arya Ceja at 7/25/2017 11:20 AM     Author: Arya Ceja Service: -- Author Type: Nurse Practitioner    Filed: 7/30/2017  4:18 PM Encounter Date: 7/25/2017 Status: Signed    : [facility]rya Ceja [facility]    Date of Service: 7/25/2017  Primary Provider: Arya Ceja CNP    Patient Care Team:  Arya Ceja CNP as PCP - General (Nurse Practitioner)     ______________________________________________________________________     Patient's Pharmacy:    [facility] [address] [address]  Phone: [phone] Fax: [phone]     Patient's Insurance:    Payor: BLUE CROSS / Plan: BLUE PLUS MA / Product Type: PMAP /     ______________________________________________________________________    Assessment:    1. Follow up    2. Depression    3. SHERLY (generalized anxiety disorder) - I question this diagnosis as of 2/5/16 - TWB       ______________________________________________________________________      PHQ-2 Total Score: 3 (6/2/2017 10:00 AM)  PHQ-9 Total Score: 16 (6/2/2017 10:00 AM)     Plan:  Patient Instructions   1. Decrease citalopram to 10 mg (1/2 tab) by mouth daily  2. Add buproprion 100 mg by mouth daily x 3-7 days, then may increase to 1 tab by mouth twice daily.  3. Continue your usual therapy sessions each week with Pita BELLAMY  4. Follow up with me in about 4 weeks.      ______________________________________________________________________     Jannymatteo Kitchen is 27 y.o. female who comes in today for:    Chief Complaint   Patient presents with   ? Follow-up     Med check, fatigue, unable to climax during sex. was started on citalopram last time, she also has been having stomach problems with it like dirhhea.        Patient Active Problem List   Diagnosis   ? 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 SHERLY (generalized anxiety disorder) - I question this diagnosis as of 2/5/16  - TWB   ? Depression   ? Obesity (BMI 30.0-34.9)   ? Contraceptive education     Current Outpatient Prescriptions   Medication Sig Note   ? DIPHENHYDRAMINE HCL (BENADRYL ALLERGY ORAL) Take by mouth. 5/17/2017: As needed   ? EPINEPHrine (EPIPEN) 0.3 mg/0.3 mL atIn Inject into thigh for severe allergic reaction.  May repeat in 15 mins if needed.  Pharmacist:  Please read note.    ? etonogestrel-ethinyl estradiol (NUVARING) 0.12-0.015 mg/24 hr vaginal ring Insert 1 each into the vagina every 28 days. Insert vaginally and leave in place for 3 consecutive weeks, then remove for 1 week.    ? buPROPion (WELLBUTRIN) 100 MG tablet Take 1 tablet (100 mg total) by mouth 2 (two) times a day.    ? citalopram (CELEXA) 20 MG tablet TAKE 1 TABLET BY MOUTH DAILY      Social History     Social History   ? Marital status: Single     Spouse name: N/A   ? Number of children: N/A   ? Years of education: N/A     Occupational History   ? Not on file.     Social History Main Topics   ? Smoking status: Never Smoker   ? Smokeless tobacco: Never Used   ? Alcohol use 1.8 oz/week     3 Cans of beer per week      Comment: socially    ? Drug use: No   ? Sexual activity: Yes     Partners: Male     Birth control/ protection: Other     Other Topics Concern   ? Not on file     Social History Narrative     ______________________________________________________________________     History of present illness: Janny Kitchen is a pleasant 27 y.o. female who presents in clinic today for follow up depression and anxiety.  She has been doing fairly well on the citalopram, however, she has developed increased tiredness and difficulty waking in the AM.  She will take naps as often as she can and sleeping in in the mornings. She has also developed some diarrhea, pretty much with each bowel movement.  In addition, she states that she has been unable to climax during intercourse on the citalopram.  She has tried adjusting the time she takes her medicine from day  to before bed and no real differences with the side effects were noticed.  She continues to see her therapist, Pita Morales, every week and she finds this to be very therapeutic and helpful.  She also notes that she has moved from her parents home recently and this freedom has been good for her.  She denies SI/HI symptoms.  We discussed the common side effects of the SSRI medications as some of what she is experiencing may likely be from her medication.    Review of systems:   10 point review of systems is negative unless noted in the HPI.    ______________________________________________________________________    Wt Readings from Last 3 Encounters:   07/25/17 188 lb (85.3 kg)   06/02/17 191 lb (86.6 kg)   05/17/17 188 lb 14.4 oz (85.7 kg)     BP Readings from Last 3 Encounters:   07/25/17 120/82   06/02/17 120/78   05/17/17 110/70     /82 (Patient Site: Right Arm, Patient Position: Sitting, Cuff Size: Adult Regular)  Pulse 77  Wt 188 lb (85.3 kg)  LMP 06/12/2017  BMI 30.81 kg/m2     Physical Exam:  General Appearance: Alert, cooperative, no distress, appears stated age  Head: Normocephalic, without obvious abnormality, atraumatic  Eyes: PERRL, conjunctiva/corneas clear, EOM's intact  Ears: Normal TM's and external ear canals, both ears  Nose: Nares normal, septum midline,mucosa normal, no drainage  Throat: Lips, mucosa, and tongue normal; teeth and gums normal, no erythema, exudate, or thrush  Neck: Supple, symmetrical, trachea midline, no adenopathy;  thyroid: not enlarged, symmetric, no tenderness/mass/nodules  Back: Symmetric, no curvature, ROM normal, no CVA tenderness  Lungs: Clear to auscultation bilaterally, respirations unlabored  Heart: Regular rate and rhythm, S1 and S2 normal, no murmur, rub, or gallop  Abdomen: Soft, non-tender, bowel sounds active all four quadrants,  no masses, no organomegaly  Musculoskeletal: Normal range of motion. No joint swelling or deformity.   Extremities:  Extremities normal, atraumatic, no cyanosis or edema  Skin: Skin color, texture, turgor normal, no rashes or lesions  Lymph nodes: Cervical & supraclavicular nodes normal  Neurologic: She is alert & oriented x 3. She has normal gait   Psychiatric: She has a normal mood and affect.       Arya Ceja CNP  Internal Medicine  [facility]     Return in about 4 weeks (around 8/22/2017).

## 2021-06-25 NOTE — PATIENT INSTRUCTIONS - HE
Continue taking Benadryl for the next 24 hours per label instructions.    Go to the Emergency Department if your symptoms worsen or fail to improve later this afternoon.    Start Medrol Dosepak tomorrow morning.

## 2021-06-25 NOTE — TELEPHONE ENCOUNTER
"She does not know what she is reacting to.  She had some wine but the hives started on the back of her hands before then.  She does not recall a reaction to wine in the past.  Her lips are swollen.  But not her tongue or throat.  She took 50 mg of Benadryl at 1900 and again at 2330.  She has taken a cool shower.  It is easier to say that the hives are not on her legs or feet.Her roommate is home.  Per reference she can not take more than 300 mg po in 24 hours and she was made aware of this.  I recommended cool towels to help with the itching.  Alma Stern, RN, BAN, [facility] RN Triage, 11p-7a      Reason for Disposition    Widespread hives    Answer Assessment - Initial Assessment Questions  1. APPEARANCE: \"What does the rash look like?\"       She has had hives before so knows what they look like.   2. LOCATION: \"Where is the rash located?\"       Easier to say they are not on her legs  3. NUMBER: \"How many hives are there?\"       Too numerous to count or unable to see (on her back)  4. SIZE: \"How big are the hives?\" (inches, cm, compare to coins) \"Do they all look the same or is there lots of variation in shape and size?\"       Some are dime, some nickel, or some quarter  5. ONSET: \"When did the hives begin?\" (Hours or days ago)       Yesterday early afternoon  6. ITCHING: \"Does it itch?\" If so, ask: \"How bad is the itch?\"     - MILD: doesn't interfere with normal activities    - MODERATE-SEVERE: interferes with work, school, sleep, or other activities       Mild to moderate  7. RECURRENT PROBLEM: \"Have you had hives before?\" If so, ask: \"When was the last time?\" and \"What happened that time?\"       Several times.  Does not usually have to use the Epipen  8. TRIGGERS: \"Were you exposed to any new food, plant, cosmetic product or animal just before the hives began?\"      She is not sure.  She had some wine in the afternoon but the hives had already started on the back of her hands before that.  9. OTHER " "SYMPTOMS: \"Do you have any other symptoms?\" (e.g., fever, tongue swelling, difficulty breathing, abdominal pain)      Her lips are swollen to twice their normal size.  NO swelling of tongue or throat, NO wheezing or shortness of breath.  No hives on face or eyes.  10. PREGNANCY: \"Is there any chance you are pregnant?\" \"When was your last menstrual period?\"        Denies    Protocols used: HIVES-CHETAN      "

## 2021-06-25 NOTE — TELEPHONE ENCOUNTER
Judy note that he is to be using the Fluoxetine and the Bupropion XL, but needs the Fluoxetine reordered as it was discontinued and he will be out before the weekend arrives.

## 2021-06-27 NOTE — PROGRESS NOTES
Progress Notes by Bonny Stevens CMA at 7/8/2019  9:20 AM     Author: Bonny Stevens CMA Service: Addiction Care Author Type: Certified Medical Assistant    Filed: 7/8/2019  1:28 PM Encounter Date: 7/8/2019 Status: Signed    : Bonny Stevens CMA (Certified Medical Assistant)       Patient here today for follow up of medication management. States depression 1-2/5 denies SI/HI, anxiety 3/5. States sleep is good. States has not been taking her medications very well. States works is better.   PHQ-8  SHERLY-8

## 2021-06-28 NOTE — PROGRESS NOTES
Progress Notes by Bonny Stevens CMA at 3/16/2020 12:20 PM     Author: Bonny Stevens CMA Service: Addiction Care Author Type: Certified Medical Assistant    Filed: 6/7/2020 12:21 PM Encounter Date: 3/16/2020 Status: Signed    : Bonny Stevens CMA (Certified Medical Assistant)       Patient here today for follow up of medication management. States depression 3-4/5 denies SI/HI, anxiety 4/5. States sleeps about 8-9 hours a night with some problem staying asleep.  PHQ-10  SHERLY-19        Correct pharmacy verified with patient and confirmed in snapshot? [x] yes []no    Charge captured ? [x] yes  [] no    Medications Phoned  to Pharmacy [] yes [x]no  Name of Pharmacist:  List Medications, including dose, quantity and instructions      Medication Prescriptions given to patient   [] yes  [] no   List the name of the drug the prescription was written for.       Medications ordered this visit were e-scribed.  Verified by order class [] yes  [x] no    Medication changes or discontinuations were communicated to patient's pharmacy: [] yes  [x] no    UA collected [] yes  [x] no    [place] Prescription Monitoring Program Reviewed? [x] yes  [] no    Referrals were made to: none     Future appointment was made: [x] yes  [] no    Dictation completed at time of chart check: [] yes  [x] no    I have checked the documentation for todays encounters and the above information has been reviewed and completed.

## 2021-06-29 NOTE — PROGRESS NOTES
Progress Notes by Radha Crum MD at 8/25/2020 11:15 AM     Author: Radha Crum MD Service: -- Author Type: Physician    Filed: 8/25/2020 11:44 AM Encounter Date: 8/25/2020 Status: Signed    : Radha Crum MD (Physician)       S: The patient is here in follow up of abnormal Paps.  See note from 5/14/19.  She had ASCUS with +other HRHPV then with a normal colposcopy.  This year her Pap from [facility] was ASCUS with negative HRHPV.  This was done on 6/19/2020.  The patient has tried to get records sent which wasn't successful, so she pulled up her patient portal to show me the results.  She would also like STI testing and to have a small lesion evaluated.  The lesion she first noted in Dec of 2019.  It was tiny then; it has grown in size, but it's still small.  It doesn't hurt, but she does feel it when she wipes.    Outpatient Medications Prior to Visit   Medication Sig Dispense Refill   ? EPINEPHrine (EPIPEN/ADRENACLICK/AUVI-Q) 0.3 mg/0.3 mL injection Inject 0.3 mL (0.3 mg total) as directed as needed for anaphylaxis. Inject into thigh. 2 Pre-filled Pen Syringe 0   ? phentermine 8 mg Tab Take 8 mg by mouth daily. 30 each 0     Facility-Administered Medications Prior to Visit   Medication Dose Route Frequency Provider Last Rate Last Dose   ? azithromycin powder 1 g (ZITHROMAX)  1 g Oral Once Martha Bryant PA-C           Patient is allergic to venomil honey bee venom [hymenoptera allergenic extract]; wasp venom; and sertraline.    O:  /74 (Patient Site: Right Arm, Patient Position: Sitting, Cuff Size: Adult Regular)   Pulse 90   Wt 201 lb (91.2 kg)           Body mass index is 31.48 kg/m .    General: WN/WD WF, NAD  EG/BUS: small 1-2 mm firm area right vulva, no erythema or scaling      Assessment: 30 y.o. SWF P0 with a small skin lesion, STI testing, and Pap not requiring colposcopy.    Plan: Vaginal GC/chlamydia testing was done.  She was reassured that the skin  lesion is nothing to worry about.  She was also reassured that the quadrivalent Gardasil was effective for the 2 types of HPV causing 95% of condyloma.  I did recommend a Pap with HPV testing in a year.  Questions were answered.  Approximately 15 minutes were spent with the patient with the majority in counseling.

## 2021-07-14 PROBLEM — F32.0 MILD MAJOR DEPRESSION (H): Status: RESOLVED | Noted: 2018-10-21 | Resolved: 2018-10-21

## 2021-08-03 ENCOUNTER — MYC MEDICAL ADVICE (OUTPATIENT)
Dept: INTERNAL MEDICINE | Facility: CLINIC | Age: 31
End: 2021-08-03

## 2021-08-03 DIAGNOSIS — F33.41 MAJOR DEPRESSIVE DISORDER, RECURRENT, IN PARTIAL REMISSION (H): Primary | ICD-10-CM

## 2021-08-03 DIAGNOSIS — F41.1 GAD (GENERALIZED ANXIETY DISORDER): ICD-10-CM

## 2021-08-05 RX ORDER — NALTREXONE HYDROCHLORIDE 50 MG/1
50 TABLET, FILM COATED ORAL
COMMUNITY
Start: 2021-06-16 | End: 2021-08-05

## 2021-08-05 RX ORDER — BUPROPION HYDROCHLORIDE 150 MG/1
150 TABLET, EXTENDED RELEASE ORAL
COMMUNITY
Start: 2021-06-16 | End: 2021-08-05

## 2021-08-05 RX ORDER — BUPROPION HYDROCHLORIDE 150 MG/1
150 TABLET, EXTENDED RELEASE ORAL 2 TIMES DAILY
Qty: 60 TABLET | Refills: 0 | Status: SHIPPED | OUTPATIENT
Start: 2021-08-05 | End: 2021-09-02

## 2021-08-05 RX ORDER — NALTREXONE HYDROCHLORIDE 50 MG/1
50 TABLET, FILM COATED ORAL DAILY
Qty: 30 TABLET | Refills: 0 | Status: SHIPPED | OUTPATIENT
Start: 2021-08-05 | End: 2021-09-02

## 2021-08-05 NOTE — TELEPHONE ENCOUNTER
Per PCP visit on 5-13-21    Problem List Items Addressed This Visit           BMI 31.0-31.9,adult        Reviewed options including referral to bariatric clinic, Weight Watchers, Noom application, visit to dietician. She will look into the dietician at [facility] where her insurance indicated coverage  - START: wellbutrin 150 mg two times a day and naltrexone 50 mg daily  - Follow up in 2 months for weight check, LFTs            Relevant Medications      naltrexone (DEPADE) 50 mg tablet      buPROPion (WELLBUTRIN SR) 150 MG 12 hr tablet      Major depressive disorder, recurrent, in partial remission (H)        Doing well from a mood standpoint  Bupropion may have some benefit for mood as well as weight loss, will repeat PHQ9 at next visit             Relevant Medications      buPROPion (WELLBUTRIN SR) 150 MG 12 hr tablet                Other Visit Diagnoses      Routine screening for STI (sexually transmitted infection)    -  Primary     Relevant Orders     Chlamydia trachomatis & Neisseria gonorrhoeae, Amplified Detection     Cervical cancer screening         Relevant Orders     Gynecologic Cytology (PAP Smear)     IUD check up         no signs of migration              I am having Janny GREGORIA Fidelina start on naltrexone and buPROPion. I am also having her maintain her SUMAtriptan, EPINEPHrine, and levonorgestreL. We will continue to administer azithromycin.                    Orders Placed This Encounter   Procedures     Chlamydia trachomatis & Neisseria gonorrhoeae, Amplified Detection   Followup: Return in about 2 months (around 7/13/2021) for Recheck. earlier if needed.

## 2021-08-14 ENCOUNTER — HEALTH MAINTENANCE LETTER (OUTPATIENT)
Age: 31
End: 2021-08-14

## 2021-08-30 ENCOUNTER — E-VISIT (OUTPATIENT)
Dept: URGENT CARE | Facility: URGENT CARE | Age: 31
End: 2021-08-30
Payer: COMMERCIAL

## 2021-08-30 ENCOUNTER — LAB (OUTPATIENT)
Dept: FAMILY MEDICINE | Facility: CLINIC | Age: 31
End: 2021-08-30
Attending: NURSE PRACTITIONER
Payer: COMMERCIAL

## 2021-08-30 DIAGNOSIS — Z20.822 SUSPECTED COVID-19 VIRUS INFECTION: ICD-10-CM

## 2021-08-30 DIAGNOSIS — Z20.822 SUSPECTED COVID-19 VIRUS INFECTION: Primary | ICD-10-CM

## 2021-08-30 PROCEDURE — U0003 INFECTIOUS AGENT DETECTION BY NUCLEIC ACID (DNA OR RNA); SEVERE ACUTE RESPIRATORY SYNDROME CORONAVIRUS 2 (SARS-COV-2) (CORONAVIRUS DISEASE [COVID-19]), AMPLIFIED PROBE TECHNIQUE, MAKING USE OF HIGH THROUGHPUT TECHNOLOGIES AS DESCRIBED BY CMS-2020-01-R: HCPCS

## 2021-08-30 PROCEDURE — U0005 INFEC AGEN DETEC AMPLI PROBE: HCPCS

## 2021-08-30 PROCEDURE — 99421 OL DIG E/M SVC 5-10 MIN: CPT | Performed by: NURSE PRACTITIONER

## 2021-08-30 NOTE — PATIENT INSTRUCTIONS
Dear Janny Kitchen,    Your symptoms show that you may have coronavirus (COVID-19). This illness can cause fever, cough and trouble breathing. Many people get a mild case and get better on their own. Some people can get very sick.    Will I be tested for COVID-19?  We would like to test you for Covid-19 virus. I have placed orders for this test.     To schedule: go to your [employer] home page and scroll down to the section that says  You have an appointment that needs to be scheduled  and click the large green button that says  Schedule Now  and follow the steps to find the next available openings.    If you are unable to complete these [employer] scheduling steps, please call [phone] to schedule your testing.     Return to work/school/ guidance:  Please let your workplace manager and staffing office know when your quarantine ends     We can t give you an exact date as it depends on the above. You can calculate this on your own or work with your manager/staffing office to calculate this. (For example if you were exposed on 10/4, you would have to quarantine for 14 full days. That would be through 10/18. You could return on 10/19.)      If you receive a positive COVID-19 test result, follow the guidance of the those who are giving you the results. Usually the return to work is 10 (or in some cases 20 days from symptom onset.) If you work at [facility], you must also be cleared by Employee Occupational Health and Safety to return to work.        If you receive a negative COVID-19 test result and did not have a high risk exposure to someone with a known positive COVID-19 test, you can return to work once you're free of fever for 24 hours without fever-reducing medication and your symptoms are improving or resolved.      If you receive a negative COVID-19 test and If you had a high risk exposure to someone who has tested positive for COVID-19 then you can return to work 14 days after your last contact  with the positive individual    Note: If you have ongoing exposure to the covid positive person, this quarantine period may be more than 14 days. (For example, if you are continued to be exposed to your child who tested positive and cannot isolate from them, then the quarantine of 7-14 days can't start until your child is no longer contagious. This is typically 10 days from onset of the child's symptoms. So the total duration may be 17-24 days in this case.)    Sign up for [employer].   We know it's scary to hear that you might have COVID-19. We want to track your symptoms to make sure you're okay over the next 2 weeks. Please look for an email from [employer]--this is a free, online program that we'll use to keep in touch. To sign up, follow the link in the email you will receive. Learn more at [email or web address]    How can I take care of myself?    Get lots of rest. Drink extra fluids (unless a doctor has told you not to)    Take Tylenol (acetaminophen) or ibuprofen for fever or pain. If you have liver or kidney problems, ask your family doctor if it's okay to take Tylenol o ibuprofen    If you have other health problems (like cancer, heart failure, an organ transplant or severe kidney disease): Call your specialty clinic if you don't feel better in the next 2 days.    Know when to call 911. Emergency warning signs include:  o Trouble breathing or shortness of breath  o Pain or pressure in the chest that doesn't go away  o Feeling confused like you haven't felt before, or not being able to wake up  o Bluish-colored lips or face    Where can I get more information?  [employer] [place] - About COVID-19:   [email or web address]    CDC - What to Do If You're Sick:   www.cdc.gov/coronavirus/2019-ncov/about/steps-when-sick.html

## 2021-08-31 LAB — SARS-COV-2 RNA RESP QL NAA+PROBE: NEGATIVE

## 2021-09-02 ENCOUNTER — OFFICE VISIT (OUTPATIENT)
Dept: INTERNAL MEDICINE | Facility: CLINIC | Age: 31
End: 2021-09-02
Payer: COMMERCIAL

## 2021-09-02 VITALS
HEART RATE: 96 BPM | DIASTOLIC BLOOD PRESSURE: 60 MMHG | RESPIRATION RATE: 16 BRPM | TEMPERATURE: 98.3 F | OXYGEN SATURATION: 99 % | BODY MASS INDEX: 28.69 KG/M2 | WEIGHT: 183.2 LBS | SYSTOLIC BLOOD PRESSURE: 124 MMHG

## 2021-09-02 DIAGNOSIS — Z13.220 LIPID SCREENING: ICD-10-CM

## 2021-09-02 DIAGNOSIS — Z83.49 FAMILY HISTORY OF THYROID DISEASE: ICD-10-CM

## 2021-09-02 DIAGNOSIS — F33.41 MAJOR DEPRESSIVE DISORDER, RECURRENT, IN PARTIAL REMISSION (H): ICD-10-CM

## 2021-09-02 PROCEDURE — 99214 OFFICE O/P EST MOD 30 MIN: CPT | Performed by: NURSE PRACTITIONER

## 2021-09-02 PROCEDURE — 96127 BRIEF EMOTIONAL/BEHAV ASSMT: CPT | Performed by: NURSE PRACTITIONER

## 2021-09-02 RX ORDER — EPINEPHRINE 0.3 MG/.3ML
0.3 INJECTION SUBCUTANEOUS
COMMUNITY
Start: 2021-06-16 | End: 2024-06-04

## 2021-09-02 RX ORDER — NALTREXONE HYDROCHLORIDE 50 MG/1
50 TABLET, FILM COATED ORAL DAILY
Qty: 90 TABLET | Refills: 0 | Status: SHIPPED | OUTPATIENT
Start: 2021-09-02 | End: 2021-12-08

## 2021-09-02 RX ORDER — BUPROPION HYDROCHLORIDE 150 MG/1
150 TABLET ORAL EVERY MORNING
Qty: 90 TABLET | Refills: 0 | Status: SHIPPED | OUTPATIENT
Start: 2021-09-02 | End: 2022-01-27

## 2021-09-02 ASSESSMENT — PATIENT HEALTH QUESTIONNAIRE - PHQ9
SUM OF ALL RESPONSES TO PHQ QUESTIONS 1-9: 7
10. IF YOU CHECKED OFF ANY PROBLEMS, HOW DIFFICULT HAVE THESE PROBLEMS MADE IT FOR YOU TO DO YOUR WORK, TAKE CARE OF THINGS AT HOME, OR GET ALONG WITH OTHER PEOPLE: SOMEWHAT DIFFICULT
SUM OF ALL RESPONSES TO PHQ QUESTIONS 1-9: 7

## 2021-09-02 ASSESSMENT — ANXIETY QUESTIONNAIRES
GAD7 TOTAL SCORE: 6
GAD7 TOTAL SCORE: 6
1. FEELING NERVOUS, ANXIOUS, OR ON EDGE: SEVERAL DAYS
3. WORRYING TOO MUCH ABOUT DIFFERENT THINGS: SEVERAL DAYS
4. TROUBLE RELAXING: SEVERAL DAYS
2. NOT BEING ABLE TO STOP OR CONTROL WORRYING: SEVERAL DAYS
6. BECOMING EASILY ANNOYED OR IRRITABLE: SEVERAL DAYS
5. BEING SO RESTLESS THAT IT IS HARD TO SIT STILL: NOT AT ALL
7. FEELING AFRAID AS IF SOMETHING AWFUL MIGHT HAPPEN: SEVERAL DAYS
GAD7 TOTAL SCORE: 6
8. IF YOU CHECKED OFF ANY PROBLEMS, HOW DIFFICULT HAVE THESE MADE IT FOR YOU TO DO YOUR WORK, TAKE CARE OF THINGS AT HOME, OR GET ALONG WITH OTHER PEOPLE?: SOMEWHAT DIFFICULT
7. FEELING AFRAID AS IF SOMETHING AWFUL MIGHT HAPPEN: SEVERAL DAYS

## 2021-09-02 NOTE — PROGRESS NOTES
Internal Medicine Office Visit  [facility]   Patient Name: Janny Kitchen  Patient Age: 31 year old  YOB: 1990  MRN: [id number]    Date of Visit: 9/2/2021  Patient presents with:  Medication Follow-up: would like to discuss medication going forward   Fever: x 2 days on and off. fever of 100 yesterday that came down with otc nyquil, dayquil, tylenol,  and thermaflu. Has Pending COVID test through [facility] (collected 9/1/2021)   Diarrhea: x 2 days, has had 10 plus loose stoools in the last 24 hours as well. denies any blood in stool and or changes in color.            Assessment / Plan / Medical Decision Making:    Problem List Items Addressed This Visit        Behavioral    Major depressive disorder, recurrent, in partial remission (H)    Relevant Medications    buPROPion (WELLBUTRIN XL) 150 MG 24 hr tablet    naltrexone (DEPADE/REVIA) 50 MG tablet       Other    BMI 28.0-28.9,adult - Primary     She is congratulated on the weight loss she has sustained so far.  She will continue with bupropion naltrexone.  We will change the bupropion to extended release as she sometimes forgets the second tablet of the day.  Additionally, for medication monitoring I recommended that she undergo a hepatic panel but since she is ill today she will return at a later time for this lab test.         Relevant Medications    buPROPion (WELLBUTRIN XL) 150 MG 24 hr tablet    Other Relevant Orders    Hepatic panel (Albumin, ALT, AST, Bili, Alk Phos, TP)    Family history of thyroid disease, mother    Relevant Orders    TSH with free T4 reflex      Other Visit Diagnoses     Lipid screening        Relevant Orders    Lipid panel reflex to direct LDL Fasting           I am having Janny Kitchen start on buPROPion. I am also having her maintain her EPINEPHrine, levonorgestrel, and naltrexone.          Orders Placed This Encounter   Procedures     REVIEW OF HEALTH MAINTENANCE PROTOCOL ORDERS      Hepatic panel (Albumin, ALT, AST, Bili, Alk Phos, TP)     TSH with free T4 reflex     Lipid panel reflex to direct LDL Fasting   Followup: Return in about 3 months (around 12/2/2021). earlier if needed.        Lisa Arredondo NP, CNP        HPI:  Janny Kitchen is a 31 year old year old who presents to the office today for follow up. She has recently been ill with loose stools and muscle soreness with a lot grade fever since Tuesday this week. COVID test was negative, she is feeling lots better today.     She was started on bupropion and naltrexone for weight loss, she frequently misses the second dose of bupropion. Has lost weight. Helps with appetite.     Is noted that her PHQ-9 improved since she started on the bupropion.       Health Maintenance Review  Health Maintenance   Topic Date Due     ADVANCE CARE PLANNING  Never done     DEPRESSION ACTION PLAN  Never done     HEPATITIS B IMMUNIZATION (3 of 3 - 3-dose primary series) 07/17/2003     HIV SCREENING  Never done     HEPATITIS C SCREENING  Never done     PREVENTIVE CARE VISIT  05/29/2016     INFLUENZA VACCINE (1) 09/07/2021 (Originally 9/1/2021)     PHQ-9  03/02/2022     PAP FOLLOW-UP  05/13/2022     HPV FOLLOW-UP  05/13/2022     ANNUAL REVIEW OF HM ORDERS  09/02/2022     DTAP/TDAP/TD IMMUNIZATION (4 - Td or Tdap) 06/11/2028     COVID-19 Vaccine  Completed     Pneumococcal Vaccine: Pediatrics (0 to 5 Years) and At-Risk Patients (6 to 64 Years)  Aged Out     IPV IMMUNIZATION  Aged Out     MENINGITIS IMMUNIZATION  Aged Out       Current Scheduled Meds:  Outpatient Encounter Medications as of 9/2/2021   Medication Sig Dispense Refill     buPROPion (WELLBUTRIN XL) 150 MG 24 hr tablet Take 1 tablet (150 mg) by mouth every morning 90 tablet 0     EPINEPHrine (ANY BX GENERIC EQUIV) 0.3 MG/0.3ML injection 2-pack 0.3 mg       levonorgestrel (MIRENA) 20 MCG/24HR IUD 1 each by Intrauterine route once       naltrexone (DEPADE/REVIA) 50 MG tablet Take 1 tablet (50 mg) by  mouth daily 90 tablet 0     [DISCONTINUED] buPROPion (WELLBUTRIN SR) 150 MG 12 hr tablet Take 1 tablet (150 mg) by mouth 2 times daily 60 tablet 0     [DISCONTINUED] naltrexone (DEPADE/REVIA) 50 MG tablet Take 1 tablet (50 mg) by mouth daily 30 tablet 0     No facility-administered encounter medications on file as of 9/2/2021.         Objective / Physical Examination:  Vitals:    09/02/21 1002   BP: 124/60   BP Location: Right arm   Patient Position: Sitting   Cuff Size: Adult Regular   Pulse: 96   Resp: 16   Temp: 98.3  F (36.8  C)   TempSrc: Oral   SpO2: 99%   Weight: 83.1 kg (183 lb 3.2 oz)     Wt Readings from Last 3 Encounters:   09/02/21 83.1 kg (183 lb 3.2 oz)   05/13/21 90.7 kg (200 lb)   03/29/21 91.2 kg (201 lb)     Body mass index is 28.69 kg/m .     Constitutional: In no apparent distress    Answers for HPI/ROS submitted by the patient on 9/2/2021  If you checked off any problems, how difficult have these problems made it for you to do your work, take care of things at home, or get along with other people?: Somewhat difficult  PHQ9 TOTAL SCORE: 7  SHERLY 7 TOTAL SCORE: 6    [facility] Follow-up to PHQ 11/25/2019 3/16/2020 9/2/2021   PHQ-9 9. Suicide Ideation past 2 weeks Several days Several days Not at all       PHQ 11/25/2019 3/16/2020 9/2/2021   PHQ-9 Total Score 9 10 7   Q9: Thoughts of better off dead/self-harm past 2 weeks Several days Several days Not at all

## 2021-09-03 PROBLEM — Z83.49 FAMILY HISTORY OF THYROID DISEASE: Status: ACTIVE | Noted: 2021-09-03

## 2021-09-03 ASSESSMENT — ANXIETY QUESTIONNAIRES: GAD7 TOTAL SCORE: 6

## 2021-09-03 ASSESSMENT — PATIENT HEALTH QUESTIONNAIRE - PHQ9: SUM OF ALL RESPONSES TO PHQ QUESTIONS 1-9: 7

## 2021-09-03 NOTE — ASSESSMENT & PLAN NOTE
She is congratulated on the weight loss she has sustained so far.  She will continue with bupropion naltrexone.  We will change the bupropion to extended release as she sometimes forgets the second tablet of the day.  Additionally, for medication monitoring I recommended that she undergo a hepatic panel but since she is ill today she will return at a later time for this lab test.

## 2021-09-10 ENCOUNTER — LAB (OUTPATIENT)
Dept: LAB | Facility: CLINIC | Age: 31
End: 2021-09-10
Payer: COMMERCIAL

## 2021-09-10 DIAGNOSIS — Z13.220 LIPID SCREENING: ICD-10-CM

## 2021-09-10 DIAGNOSIS — Z83.49 FAMILY HISTORY OF THYROID DISEASE: ICD-10-CM

## 2021-09-10 LAB
ALBUMIN SERPL-MCNC: 4.1 G/DL (ref 3.5–5)
ALP SERPL-CCNC: 70 U/L (ref 45–120)
ALT SERPL W P-5'-P-CCNC: 25 U/L (ref 0–45)
AST SERPL W P-5'-P-CCNC: 18 U/L (ref 0–40)
BILIRUB DIRECT SERPL-MCNC: 0.2 MG/DL
BILIRUB SERPL-MCNC: 0.8 MG/DL (ref 0–1)
CHOLEST SERPL-MCNC: 188 MG/DL
FASTING STATUS PATIENT QL REPORTED: YES
HDLC SERPL-MCNC: 55 MG/DL
LDLC SERPL CALC-MCNC: 113 MG/DL
PROT SERPL-MCNC: 7.5 G/DL (ref 6–8)
TRIGL SERPL-MCNC: 99 MG/DL
TSH SERPL DL<=0.005 MIU/L-ACNC: 1.69 UIU/ML (ref 0.3–5)

## 2021-09-10 PROCEDURE — 36415 COLL VENOUS BLD VENIPUNCTURE: CPT

## 2021-09-10 PROCEDURE — 80076 HEPATIC FUNCTION PANEL: CPT

## 2021-09-10 PROCEDURE — 80061 LIPID PANEL: CPT

## 2021-09-10 PROCEDURE — 84443 ASSAY THYROID STIM HORMONE: CPT

## 2021-10-09 ENCOUNTER — HEALTH MAINTENANCE LETTER (OUTPATIENT)
Age: 31
End: 2021-10-09

## 2021-10-19 PROBLEM — F32.9 MAJOR DEPRESSION: Status: RESOLVED | Noted: 2018-10-21 | Resolved: 2018-10-21

## 2021-12-08 ENCOUNTER — OFFICE VISIT (OUTPATIENT)
Dept: INTERNAL MEDICINE | Facility: CLINIC | Age: 31
End: 2021-12-08
Payer: COMMERCIAL

## 2021-12-08 VITALS
BODY MASS INDEX: 30.7 KG/M2 | HEART RATE: 90 BPM | WEIGHT: 196 LBS | DIASTOLIC BLOOD PRESSURE: 72 MMHG | SYSTOLIC BLOOD PRESSURE: 120 MMHG

## 2021-12-08 DIAGNOSIS — F41.1 GAD (GENERALIZED ANXIETY DISORDER): ICD-10-CM

## 2021-12-08 DIAGNOSIS — F33.41 MAJOR DEPRESSIVE DISORDER, RECURRENT, IN PARTIAL REMISSION (H): Primary | ICD-10-CM

## 2021-12-08 PROCEDURE — 99214 OFFICE O/P EST MOD 30 MIN: CPT | Mod: 25 | Performed by: NURSE PRACTITIONER

## 2021-12-08 PROCEDURE — 90686 IIV4 VACC NO PRSV 0.5 ML IM: CPT | Performed by: NURSE PRACTITIONER

## 2021-12-08 PROCEDURE — 90471 IMMUNIZATION ADMIN: CPT | Performed by: NURSE PRACTITIONER

## 2021-12-08 PROCEDURE — 96127 BRIEF EMOTIONAL/BEHAV ASSMT: CPT | Performed by: NURSE PRACTITIONER

## 2021-12-08 RX ORDER — BUPROPION HYDROCHLORIDE 300 MG/1
300 TABLET ORAL EVERY MORNING
Qty: 30 TABLET | Refills: 1 | Status: SHIPPED | OUTPATIENT
Start: 2021-12-08 | End: 2022-01-27

## 2021-12-08 ASSESSMENT — PATIENT HEALTH QUESTIONNAIRE - PHQ9
10. IF YOU CHECKED OFF ANY PROBLEMS, HOW DIFFICULT HAVE THESE PROBLEMS MADE IT FOR YOU TO DO YOUR WORK, TAKE CARE OF THINGS AT HOME, OR GET ALONG WITH OTHER PEOPLE: SOMEWHAT DIFFICULT
SUM OF ALL RESPONSES TO PHQ QUESTIONS 1-9: 11
SUM OF ALL RESPONSES TO PHQ QUESTIONS 1-9: 11

## 2021-12-08 ASSESSMENT — ANXIETY QUESTIONNAIRES
2. NOT BEING ABLE TO STOP OR CONTROL WORRYING: MORE THAN HALF THE DAYS
4. TROUBLE RELAXING: SEVERAL DAYS
3. WORRYING TOO MUCH ABOUT DIFFERENT THINGS: MORE THAN HALF THE DAYS
5. BEING SO RESTLESS THAT IT IS HARD TO SIT STILL: SEVERAL DAYS
GAD7 TOTAL SCORE: 12
6. BECOMING EASILY ANNOYED OR IRRITABLE: MORE THAN HALF THE DAYS
GAD7 TOTAL SCORE: 12
7. FEELING AFRAID AS IF SOMETHING AWFUL MIGHT HAPPEN: MORE THAN HALF THE DAYS
7. FEELING AFRAID AS IF SOMETHING AWFUL MIGHT HAPPEN: MORE THAN HALF THE DAYS
1. FEELING NERVOUS, ANXIOUS, OR ON EDGE: MORE THAN HALF THE DAYS
GAD7 TOTAL SCORE: 12

## 2021-12-08 NOTE — PROGRESS NOTES
Answers for HPI/ROS submitted by the patient on 12/8/2021  If you checked off any problems, how difficult have these problems made it for you to do your work, take care of things at home, or get along with other people?: Somewhat difficult  PHQ9 TOTAL SCORE: 11  SHERLY 7 TOTAL SCORE: 12      Assessment & Plan   Problem List Items Addressed This Visit        Behavioral    SHERLY (generalized anxiety disorder)     Relevant Medications    buPROPion (WELLBUTRIN XL) 300 MG 24 hr tablet    Major depressive disorder, recurrent, in partial remission (H) - Primary    Relevant Medications    buPROPion (WELLBUTRIN XL) 300 MG 24 hr tablet    Other Relevant Orders    Adult Mental Health Referral      - H/o medication intolerances. Currently tolerating bupropion and would strongly prefer a weight neutral medication. INCREASE bupropion to 300 mg daily. Follow up in 6 weeks either with myself or psychiatry  - Consider Gene Sight testing if ineffective OR may follow up with psychiatry for further medication management until stable  - Continue follow up with psychology       Return in about 6 weeks (around 1/19/2022) for Follow up.    Lisa Arredondo NP  [facility]    Maddie Soliman is a 31 year old who presents for the following health issues     She is seeing a therapist, two in fact that work in the same office but do different types of therapy. She has been on medication for depression in the past but doesn't like the idea of being on medication.'    Wellbutrin- helped with energy levels  Fluoxetine- had insomnia, took a while for the insomnia to resolve  Sertraline- Diarrhea even after months of taking the medication      History of Present Illness       Mental Health Follow-up:  Patient presents to follow-up on Depression & Anxiety.Patient's depression since last visit has been:  Worse  The patient is not having other symptoms associated with depression.  Patient's anxiety since last visit has been:   Medium  The patient is not having other symptoms associated with anxiety.  Any significant life events: job concerns and financial concerns  Patient is feeling anxious or having panic attacks.  Patient has no concerns about alcohol or drug use.     Social History  Tobacco Use    Smoking status: Never Smoker    Smokeless tobacco: Never Used  Alcohol use: Yes    Alcohol/week: 3.0 standard drinks    Comment: Alcoholic Drinks/day: socially   Drug use: No      Today's PHQ-9         PHQ-9 Total Score:     (P) 11   PHQ-9 Q9 Thoughts of better off dead/self-harm past 2 weeks :   (P) Not at all   Thoughts of suicide or self harm:      Self-harm Plan:        Self-harm Action:          Safety concerns for self or others:           She eats 0-1 servings of fruits and vegetables daily.She consumes 1 sweetened beverage(s) daily.She exercises with enough effort to increase her heart rate 20 to 29 minutes per day.  She exercises with enough effort to increase her heart rate 3 or less days per week.   She is taking medications regularly.     Answers for HPI/ROS submitted by the patient on 12/8/2021  If you checked off any problems, how difficult have these problems made it for you to do your work, take care of things at home, or get along with other people?: Somewhat difficult  PHQ9 TOTAL SCORE: 11  SHERLY 7 TOTAL SCORE: 12    Depression Followup    How are you doing with your depression since your last visit? Worsened    Are you having other symptoms that might be associated with depression? No    Have you had a significant life event?  No     Are you feeling anxious or having panic attacks?   Yes:  Anxious    Do you have any concerns with your use of alcohol or other drugs? No    Social History     Tobacco Use     Smoking status: Never Smoker     Smokeless tobacco: Never Used   Substance Use Topics     Alcohol use: Yes     Alcohol/week: 3.0 standard drinks     Comment: Alcoholic Drinks/day: socially      Drug use: No     PHQ  3/16/2020 9/2/2021 12/8/2021   PHQ-9 Total Score 10 7 11   Q9: Thoughts of better off dead/self-harm past 2 weeks Several days Not at all Not at all     SHERLY-7 SCORE 3/16/2020 9/2/2021 12/8/2021   Total Score - 6 (mild anxiety) 12 (moderate anxiety)   Total Score 19 6 12     Last PHQ-9 12/8/2021   1.  Little interest or pleasure in doing things 2   2.  Feeling down, depressed, or hopeless 2   3.  Trouble falling or staying asleep, or sleeping too much 1   4.  Feeling tired or having little energy 2   5.  Poor appetite or overeating 1   6.  Feeling bad about yourself 2   7.  Trouble concentrating 1   8.  Moving slowly or restless 0   Q9: Thoughts of better off dead/self-harm past 2 weeks 0   PHQ-9 Total Score 11     SHERLY-7  12/8/2021   1. Feeling nervous, anxious, or on edge 2   2. Not being able to stop or control worrying 2   3. Worrying too much about different things 2   4. Trouble relaxing 1   5. Being so restless that it is hard to sit still 1   6. Becoming easily annoyed or irritable 2   7. Feeling afraid, as if something awful might happen 2   SHERLY-7 Total Score 12             Objective    /72 (BP Location: Right arm, Patient Position: Sitting, Cuff Size: Adult Regular)   Pulse 90   Wt 88.9 kg (196 lb)   BMI 30.70 kg/m    Body mass index is 30.7 kg/m .   Wt Readings from Last 5 Encounters:   12/08/21 88.9 kg (196 lb)   09/02/21 83.1 kg (183 lb 3.2 oz)   05/13/21 90.7 kg (200 lb)   03/29/21 91.2 kg (201 lb)   03/10/21 91.2 kg (201 lb)       Physical Exam   GENERAL: healthy, alert and no distress  PSYCH: mentation appears normal, tearful and judgement and insight intact

## 2021-12-09 ASSESSMENT — PATIENT HEALTH QUESTIONNAIRE - PHQ9: SUM OF ALL RESPONSES TO PHQ QUESTIONS 1-9: 11

## 2021-12-09 ASSESSMENT — ANXIETY QUESTIONNAIRES: GAD7 TOTAL SCORE: 12

## 2022-01-27 ENCOUNTER — VIRTUAL VISIT (OUTPATIENT)
Dept: INTERNAL MEDICINE | Facility: CLINIC | Age: 32
End: 2022-01-27
Payer: COMMERCIAL

## 2022-01-27 DIAGNOSIS — F33.41 MAJOR DEPRESSIVE DISORDER, RECURRENT, IN PARTIAL REMISSION (H): ICD-10-CM

## 2022-01-27 DIAGNOSIS — L70.0 ACNE VULGARIS: Primary | ICD-10-CM

## 2022-01-27 PROCEDURE — 99213 OFFICE O/P EST LOW 20 MIN: CPT | Mod: 95 | Performed by: NURSE PRACTITIONER

## 2022-01-27 RX ORDER — BUPROPION HYDROCHLORIDE 300 MG/1
300 TABLET ORAL EVERY MORNING
Qty: 90 TABLET | Refills: 1 | Status: SHIPPED | OUTPATIENT
Start: 2022-01-27 | End: 2022-04-23

## 2022-01-27 RX ORDER — BUPROPION HYDROCHLORIDE 300 MG/1
300 TABLET ORAL EVERY MORNING
Qty: 30 TABLET | Refills: 1 | Status: SHIPPED | OUTPATIENT
Start: 2022-01-27 | End: 2022-01-27

## 2022-01-27 RX ORDER — ADAPALENE 45 G/G
1 GEL TOPICAL AT BEDTIME
COMMUNITY
End: 2022-08-11

## 2022-01-27 ASSESSMENT — PATIENT HEALTH QUESTIONNAIRE - PHQ9
10. IF YOU CHECKED OFF ANY PROBLEMS, HOW DIFFICULT HAVE THESE PROBLEMS MADE IT FOR YOU TO DO YOUR WORK, TAKE CARE OF THINGS AT HOME, OR GET ALONG WITH OTHER PEOPLE: SOMEWHAT DIFFICULT
SUM OF ALL RESPONSES TO PHQ QUESTIONS 1-9: 5
SUM OF ALL RESPONSES TO PHQ QUESTIONS 1-9: 5

## 2022-01-27 ASSESSMENT — ANXIETY QUESTIONNAIRES
7. FEELING AFRAID AS IF SOMETHING AWFUL MIGHT HAPPEN: SEVERAL DAYS
1. FEELING NERVOUS, ANXIOUS, OR ON EDGE: SEVERAL DAYS
GAD7 TOTAL SCORE: 6
6. BECOMING EASILY ANNOYED OR IRRITABLE: SEVERAL DAYS
4. TROUBLE RELAXING: SEVERAL DAYS
7. FEELING AFRAID AS IF SOMETHING AWFUL MIGHT HAPPEN: SEVERAL DAYS
GAD7 TOTAL SCORE: 6
GAD7 TOTAL SCORE: 6
2. NOT BEING ABLE TO STOP OR CONTROL WORRYING: SEVERAL DAYS
5. BEING SO RESTLESS THAT IT IS HARD TO SIT STILL: NOT AT ALL
3. WORRYING TOO MUCH ABOUT DIFFERENT THINGS: SEVERAL DAYS

## 2022-01-27 NOTE — PROGRESS NOTES
Janny is a 31 year old who is being evaluated via a billable video visit.      How would you like to obtain your AVS? MyChart  If the video visit is dropped, the invitation should be resent by: Text to cell phone: [phone]  Will anyone else be joining your video visit? No      Internal Medicine Virtual Visit  [facility]   Patient Name: Janny Kitchen  Patient Age: 31 year old  YOB: 1990  MRN: [id number]    Date of Visit: 1/27/2022  Patient presents with:  Depression       Video Start Time: 9:53 AM      Assessment / Plan / Medical Decision Making:    Problem List Items Addressed This Visit        Musculoskeletal and Integumentary    Acne vulgaris - Primary     Continue with over-the-counter medications including Differin gel and benzyl peroxide gel.  Follow-up if worsening or no improvement         Relevant Medications    adapalene (DIFFERIN) 0.1 % external gel       Behavioral    Major depressive disorder, recurrent, in partial remission (H)     Doing well with current dose of bupropion.  She will continue with this and continues to see a therapist regularly.  Follow-up in 3 months for recheck.         Relevant Medications    buPROPion (WELLBUTRIN XL) 300 MG 24 hr tablet           I am having Janny Kitchen maintain her EPINEPHrine, levonorgestrel, adapalene, and buPROPion.          No orders of the defined types were placed in this encounter.  Followup: Return in about 3 months (around 4/27/2022) for Follow up. earlier if needed.      Lisa Arredondo, DNP, APRN, CNP           HPI:  Janny Kitchen is a 31 year old year old who was contacted virtually today for follow up of depression and anxiety. She generally feels like things are better since she increased the dose of bupropion. She is still seeing a therapist regularly, at least once a month. She is following a better routine.     Depression and Anxiety Follow-Up    How are you doing with your depression since your last  visit? Improved     How are you doing with your anxiety since your last visit?  No change    Are you having other symptoms that might be associated with depression or anxiety? No    Have you had a significant life event? No     Do you have any concerns with your use of alcohol or other drugs? No    Answers for HPI/ROS submitted by the patient on 1/27/2022  If you checked off any problems, how difficult have these problems made it for you to do your work, take care of things at home, or get along with other people?: Somewhat difficult  PHQ9 TOTAL SCORE: 5  SHERLY 7 TOTAL SCORE: 6    She asks about acne management options. She has been using differin OTC for about a year.     Health Maintenance Review  Health Maintenance   Topic Date Due     ADVANCE CARE PLANNING  Never done     DEPRESSION ACTION PLAN  Never done     HEPATITIS B IMMUNIZATION (3 of 3 - 3-dose primary series) 07/17/2003     PREVENTIVE CARE VISIT  05/29/2016     PAP FOLLOW-UP  05/13/2022     HPV FOLLOW-UP  05/13/2022     PHQ-9  07/27/2022     ANNUAL REVIEW OF HM ORDERS  09/02/2022     DTAP/TDAP/TD IMMUNIZATION (4 - Td or Tdap) 06/11/2028     INFLUENZA VACCINE  Completed     COVID-19 Vaccine  Completed     Pneumococcal Vaccine: Pediatrics (0 to 5 Years) and At-Risk Patients (6 to 64 Years)  Aged Out     IPV IMMUNIZATION  Aged Out     MENINGITIS IMMUNIZATION  Aged Out     HEPATITIS C SCREENING  Discontinued     HIV SCREENING  Discontinued       Current Scheduled Meds:  Outpatient Encounter Medications as of 1/27/2022   Medication Sig Dispense Refill     adapalene (DIFFERIN) 0.1 % external gel Apply 1 Application topically At Bedtime       buPROPion (WELLBUTRIN XL) 300 MG 24 hr tablet Take 1 tablet (300 mg) by mouth every morning 90 tablet 1     EPINEPHrine (ANY BX GENERIC EQUIV) 0.3 MG/0.3ML injection 2-pack 0.3 mg       levonorgestrel (MIRENA) 20 MCG/24HR IUD 1 each by Intrauterine route once       [DISCONTINUED] buPROPion (WELLBUTRIN XL) 150 MG 24 hr tablet  Take 1 tablet (150 mg) by mouth every morning (Patient not taking: Reported on 1/27/2022) 90 tablet 0     [DISCONTINUED] buPROPion (WELLBUTRIN XL) 300 MG 24 hr tablet Take 1 tablet (300 mg) by mouth every morning 30 tablet 1     [DISCONTINUED] buPROPion (WELLBUTRIN XL) 300 MG 24 hr tablet Take 1 tablet (300 mg) by mouth every morning 30 tablet 1     No facility-administered encounter medications on file as of 1/27/2022.       Objective / Physical Examination:  There were no vitals filed for this visit.  Wt Readings from Last 3 Encounters:   12/08/21 88.9 kg (196 lb)   09/02/21 83.1 kg (183 lb 3.2 oz)   05/13/21 90.7 kg (200 lb)     There is no height or weight on file to calculate BMI.     GENERAL: Healthy, alert and no distress  EYES: Eyes grossly normal to inspection.  No discharge or erythema, or obvious scleral/conjunctival abnormalities.  RESP: No audible wheeze, cough, or visible cyanosis.  No visible retractions or increased work of breathing.    SKIN: Visible skin clear.  Chin with closed comedones and papular acne  NEURO: Cranial nerves grossly intact.  Mentation and speech appropriate for age.  PSYCH: Mentation appears normal, affect normal/bright, judgement and insight intact, normal speech and appearance well-groomed.        Video-Visit Details    Type of service:  Video Visit    Video End Time:10:05 AM    Originating Location (pt. Location): Home    Distant Location (provider location):  [facility]     Platform used for Video Visit: [employer]

## 2022-01-28 PROBLEM — L70.0 ACNE VULGARIS: Status: ACTIVE | Noted: 2022-01-28

## 2022-01-28 ASSESSMENT — PATIENT HEALTH QUESTIONNAIRE - PHQ9: SUM OF ALL RESPONSES TO PHQ QUESTIONS 1-9: 5

## 2022-01-28 ASSESSMENT — ANXIETY QUESTIONNAIRES: GAD7 TOTAL SCORE: 6

## 2022-01-28 NOTE — ASSESSMENT & PLAN NOTE
Continue with over-the-counter medications including Differin gel and benzyl peroxide gel.  Follow-up if worsening or no improvement

## 2022-01-28 NOTE — ASSESSMENT & PLAN NOTE
Doing well with current dose of bupropion.  She will continue with this and continues to see a therapist regularly.  Follow-up in 3 months for recheck.

## 2022-04-27 ENCOUNTER — PATIENT OUTREACH (OUTPATIENT)
Dept: INTERNAL MEDICINE | Facility: CLINIC | Age: 32
End: 2022-04-27
Payer: COMMERCIAL

## 2022-05-25 NOTE — TELEPHONE ENCOUNTER
Patient due for Pap and HPV.    Reminder done today via telephone call-spoke to the pt, she has the phone number to schedule.

## 2022-06-22 NOTE — TELEPHONE ENCOUNTER
Vasiliy Gonzalez,   Patient is lost to pap tracking follow-up. Attempts to contact pt have been made per reminder process and there has been no reply and/or no appt scheduled.       Pap Hx:  8/4/09 ASCUS, +HR HPV (unknown strain)  2010, 2011 NIL paps  3/5/19 ASCUS, +HR HPV (unknown strain)  Above per Care Everywhere/scanned documents  5/13/21 NIL pap, neg HPV. Plan: cotest in 1 year  04/27/22 Reminder Mychart  05/25/22 Reminder call-spoke to the pt, she has the phone number to schedule  06/22/22 Lost to follow-up for pap tracking, laney routed to provider

## 2022-08-11 ENCOUNTER — OFFICE VISIT (OUTPATIENT)
Dept: INTERNAL MEDICINE | Facility: CLINIC | Age: 32
End: 2022-08-11
Payer: COMMERCIAL

## 2022-08-11 VITALS
HEART RATE: 81 BPM | TEMPERATURE: 98.2 F | RESPIRATION RATE: 16 BRPM | HEIGHT: 66 IN | SYSTOLIC BLOOD PRESSURE: 112 MMHG | WEIGHT: 195.5 LBS | DIASTOLIC BLOOD PRESSURE: 70 MMHG | OXYGEN SATURATION: 99 % | BODY MASS INDEX: 31.42 KG/M2

## 2022-08-11 DIAGNOSIS — L70.0 ACNE VULGARIS: ICD-10-CM

## 2022-08-11 DIAGNOSIS — E66.09 CLASS 1 OBESITY DUE TO EXCESS CALORIES WITHOUT SERIOUS COMORBIDITY WITH BODY MASS INDEX (BMI) OF 31.0 TO 31.9 IN ADULT: ICD-10-CM

## 2022-08-11 DIAGNOSIS — M89.9 OSTEOCHONDRAL LESION OF TALAR DOME: ICD-10-CM

## 2022-08-11 DIAGNOSIS — Z00.00 ROUTINE GENERAL MEDICAL EXAMINATION AT A HEALTH CARE FACILITY: ICD-10-CM

## 2022-08-11 DIAGNOSIS — Z12.4 CERVICAL CANCER SCREENING: Primary | ICD-10-CM

## 2022-08-11 DIAGNOSIS — R19.5 LOOSE STOOLS: ICD-10-CM

## 2022-08-11 DIAGNOSIS — F33.41 MAJOR DEPRESSIVE DISORDER, RECURRENT, IN PARTIAL REMISSION (H): ICD-10-CM

## 2022-08-11 DIAGNOSIS — Z11.3 ROUTINE SCREENING FOR STI (SEXUALLY TRANSMITTED INFECTION): ICD-10-CM

## 2022-08-11 DIAGNOSIS — R87.610 ASCUS WITH POSITIVE HIGH RISK HPV CERVICAL: ICD-10-CM

## 2022-08-11 DIAGNOSIS — M94.9 OSTEOCHONDRAL LESION OF TALAR DOME: ICD-10-CM

## 2022-08-11 DIAGNOSIS — E66.811 CLASS 1 OBESITY DUE TO EXCESS CALORIES WITHOUT SERIOUS COMORBIDITY WITH BODY MASS INDEX (BMI) OF 31.0 TO 31.9 IN ADULT: ICD-10-CM

## 2022-08-11 DIAGNOSIS — R87.810 ASCUS WITH POSITIVE HIGH RISK HPV CERVICAL: ICD-10-CM

## 2022-08-11 LAB
CRP SERPL-MCNC: 4.3 MG/L
HBA1C MFR BLD: 5.1 % (ref 0–5.6)
INSULIN SERPL-ACNC: 10.4 UU/ML (ref 2.6–24.9)

## 2022-08-11 PROCEDURE — 99000 SPECIMEN HANDLING OFFICE-LAB: CPT | Performed by: NURSE PRACTITIONER

## 2022-08-11 PROCEDURE — 87624 HPV HI-RISK TYP POOLED RSLT: CPT | Performed by: NURSE PRACTITIONER

## 2022-08-11 PROCEDURE — 83520 IMMUNOASSAY QUANT NOS NONAB: CPT | Mod: 90 | Performed by: NURSE PRACTITIONER

## 2022-08-11 PROCEDURE — G0145 SCR C/V CYTO,THINLAYER,RESCR: HCPCS | Performed by: NURSE PRACTITIONER

## 2022-08-11 PROCEDURE — 90743 HEPB VACC 2 DOSE ADOLESC IM: CPT | Performed by: NURSE PRACTITIONER

## 2022-08-11 PROCEDURE — 36415 COLL VENOUS BLD VENIPUNCTURE: CPT | Performed by: NURSE PRACTITIONER

## 2022-08-11 PROCEDURE — 86140 C-REACTIVE PROTEIN: CPT | Performed by: NURSE PRACTITIONER

## 2022-08-11 PROCEDURE — 87591 N.GONORRHOEAE DNA AMP PROB: CPT | Performed by: NURSE PRACTITIONER

## 2022-08-11 PROCEDURE — 83036 HEMOGLOBIN GLYCOSYLATED A1C: CPT | Performed by: NURSE PRACTITIONER

## 2022-08-11 PROCEDURE — 96127 BRIEF EMOTIONAL/BEHAV ASSMT: CPT | Performed by: NURSE PRACTITIONER

## 2022-08-11 PROCEDURE — 83525 ASSAY OF INSULIN: CPT | Performed by: NURSE PRACTITIONER

## 2022-08-11 PROCEDURE — 99213 OFFICE O/P EST LOW 20 MIN: CPT | Mod: 25 | Performed by: NURSE PRACTITIONER

## 2022-08-11 PROCEDURE — 90471 IMMUNIZATION ADMIN: CPT | Performed by: NURSE PRACTITIONER

## 2022-08-11 PROCEDURE — 99395 PREV VISIT EST AGE 18-39: CPT | Mod: 25 | Performed by: NURSE PRACTITIONER

## 2022-08-11 PROCEDURE — 87491 CHLMYD TRACH DNA AMP PROBE: CPT | Performed by: NURSE PRACTITIONER

## 2022-08-11 PROCEDURE — 83090 ASSAY OF HOMOCYSTEINE: CPT | Performed by: NURSE PRACTITIONER

## 2022-08-11 RX ORDER — SPIRONOLACTONE 50 MG/1
TABLET, FILM COATED ORAL
COMMUNITY
Start: 2022-07-01 | End: 2023-11-20

## 2022-08-11 RX ORDER — TRIAMCINOLONE ACETONIDE 1 MG/G
CREAM TOPICAL
COMMUNITY
Start: 2022-05-06 | End: 2023-11-20

## 2022-08-11 RX ORDER — CLINDAMYCIN PHOSPHATE 10 UG/ML
LOTION TOPICAL
COMMUNITY
Start: 2022-05-06 | End: 2023-11-20

## 2022-08-11 RX ORDER — BUPROPION HYDROCHLORIDE 300 MG/1
300 TABLET ORAL EVERY MORNING
Qty: 90 TABLET | Refills: 1 | Status: SHIPPED | OUTPATIENT
Start: 2022-08-11 | End: 2023-01-26

## 2022-08-11 RX ORDER — TRETINOIN 0.025 %
CREAM (GRAM) TOPICAL
COMMUNITY
Start: 2022-03-17 | End: 2023-11-20

## 2022-08-11 ASSESSMENT — PATIENT HEALTH QUESTIONNAIRE - PHQ9
SUM OF ALL RESPONSES TO PHQ QUESTIONS 1-9: 8
10. IF YOU CHECKED OFF ANY PROBLEMS, HOW DIFFICULT HAVE THESE PROBLEMS MADE IT FOR YOU TO DO YOUR WORK, TAKE CARE OF THINGS AT HOME, OR GET ALONG WITH OTHER PEOPLE: SOMEWHAT DIFFICULT
SUM OF ALL RESPONSES TO PHQ QUESTIONS 1-9: 8

## 2022-08-11 ASSESSMENT — ENCOUNTER SYMPTOMS
PALPITATIONS: 0
BREAST MASS: 0
JOINT SWELLING: 1
FEVER: 0
DIZZINESS: 0
HEADACHES: 0
COUGH: 0
NERVOUS/ANXIOUS: 1
WEAKNESS: 0
ARTHRALGIAS: 1
EYE PAIN: 0
CHILLS: 0
DYSURIA: 0
SORE THROAT: 0
HEARTBURN: 0
SHORTNESS OF BREATH: 0
CONSTIPATION: 0
HEMATOCHEZIA: 0
MYALGIAS: 0
DIARRHEA: 0
FREQUENCY: 1
HEMATURIA: 0
NAUSEA: 0
ABDOMINAL PAIN: 0
PARESTHESIAS: 0

## 2022-08-11 ASSESSMENT — PAIN SCALES - GENERAL: PAINLEVEL: MODERATE PAIN (4)

## 2022-08-11 NOTE — PROGRESS NOTES
SUBJECTIVE:   CC: Janny Kitchen is an 32 year old woman who presents for preventive health visit.     She is working with [employer], a functional medicine clinic. She is seeing a nutritionist. Following a gluten and dairy free diet. Working on weight loss and digestive issues. She still has occasional loose stools and abdominal discomfort, symptoms are associated with stress as well as certain foods. There are certain labs the clinic suggested we discuss today including screening for insulinemia, inflammation, thyroid disease.     Her left ankle has been painful for at least 9 years. History of a talar dome injury and then she later broke this ankle. She was seen by [facility] and was told surgery could be a potential option but she wished to defer surgery at the time of her last appointment.         Healthy Habits:     Getting at least 3 servings of Calcium per day:  NO    Bi-annual eye exam:  Yes    Dental care twice a year:  Yes    Sleep apnea or symptoms of sleep apnea:  Daytime drowsiness    Diet:  Other    Frequency of exercise:  2-3 days/week    Duration of exercise:  30-45 minutes    Taking medications regularly:  Yes    Medication side effects:  Not applicable    PHQ-2 Total Score: 2    Additional concerns today:  No    Answers for HPI/ROS submitted by the patient on 8/11/2022  If you checked off any problems, how difficult have these problems made it for you to do your work, take care of things at home, or get along with other people?: Somewhat difficult  PHQ9 TOTAL SCORE: 8    Today's PHQ-2 Score:   PHQ-2 ( 1999 [employer]) 8/11/2022   Q1: Little interest or pleasure in doing things 1   Q2: Feeling down, depressed or hopeless 1   PHQ-2 Score 2   Q1: Little interest or pleasure in doing things Several days   Q2: Feeling down, depressed or hopeless Several days   PHQ-2 Score 2       Abuse: Current or Past (Physical, Sexual or Emotional) - No  Do you feel safe in your environment? Yes    Have  you ever done Advance Care Planning? (For example, a Health Directive, POLST, or a discussion with a medical provider or your loved ones about your wishes): No, advance care planning information given to patient to review.  Patient declined advance care planning discussion at this time.    Social History     Tobacco Use     Smoking status: Never Smoker     Smokeless tobacco: Never Used   Substance Use Topics     Alcohol use: Yes     Alcohol/week: 3.0 standard drinks     Comment: Alcoholic Drinks/day: socially          Alcohol Use 8/11/2022   Prescreen: >3 drinks/day or >7 drinks/week? No       Reviewed orders with patient.  Reviewed health maintenance and updated orders accordingly - Yes      FHS-7:   Breast CA Risk Assessment (FHS-7) 8/11/2022   Did any of your first-degree relatives have breast or ovarian cancer? No   Did any of your relatives have bilateral breast cancer? No   Did any man in your family have breast cancer? No   Did any woman in your family have breast and ovarian cancer? No   Did any woman in your family have breast cancer before age 50 y? No   Do you have 2 or more relatives with breast and/or ovarian cancer? No   Do you have 2 or more relatives with breast and/or bowel cancer? No       PAP / HPV Latest Ref Rng & Units 8/11/2022 5/13/2021 3/5/2019   PAP   Negative for Intraepithelial Lesion or Malignancy (NILM) Negative for squamous intraepithelial lesion or malignancy  Electronically signed by Moraima Fonseca CT (ASCP) on 5/19/2021 at  2:59 PM   -   HPV16 Negative Negative Negative -   HPV18 Negative Negative Negative -   HPV - - - See Scanned Report(A)   HRHPV Negative Negative Negative -     Reviewed and updated as needed this visit by clinical staff   Tobacco  Allergies  Meds  Problems  Med Hx  Surg Hx  Fam Hx  Soc   Hx          Reviewed and updated as needed this visit by Provider     Meds  Problems                  Review of Systems   Constitutional: Negative for chills and  "fever.   HENT: Negative for congestion, ear pain, hearing loss and sore throat.    Eyes: Negative for pain and visual disturbance.   Respiratory: Negative for cough and shortness of breath.    Cardiovascular: Negative for chest pain, palpitations and peripheral edema.   Gastrointestinal: Negative for abdominal pain, constipation, diarrhea, heartburn, hematochezia and nausea.   Breasts:  Negative for tenderness, breast mass and discharge.   Genitourinary: Positive for frequency and vaginal discharge. Negative for dysuria, genital sores, hematuria, pelvic pain, urgency and vaginal bleeding.   Musculoskeletal: Positive for arthralgias and joint swelling. Negative for myalgias.   Skin: Negative for rash.   Neurological: Negative for dizziness, weakness, headaches and paresthesias.   Psychiatric/Behavioral: Positive for mood changes. The patient is nervous/anxious.         OBJECTIVE:   /70 (BP Location: Right arm, Patient Position: Sitting, Cuff Size: Adult Regular)   Pulse 81   Temp 98.2  F (36.8  C) (Oral)   Resp 16   Ht 1.67 m (5' 5.75\")   Wt 88.7 kg (195 lb 8 oz)   SpO2 99%   BMI 31.79 kg/m     Wt Readings from Last 5 Encounters:   08/11/22 88.7 kg (195 lb 8 oz)   12/08/21 88.9 kg (196 lb)   09/02/21 83.1 kg (183 lb 3.2 oz)   05/13/21 90.7 kg (200 lb)   03/29/21 91.2 kg (201 lb)       Physical Exam  GENERAL: healthy, alert and no distress  EYES: Eyes grossly normal to inspection, conjunctivae and sclerae normal  HENT: ear canals and TM's normal, nose and mouth without ulcers or lesions  NECK: no adenopathy, no asymmetry, masses, or scars and thyroid normal to palpation  RESP: lungs clear to auscultation - no rales, rhonchi or wheezes  BREAST: normal without masses, tenderness or nipple discharge and no palpable axillary masses or adenopathy  CV: regular rate and rhythm, normal S1 S2, no S3 or S4, no murmur, click or rub, no peripheral edema and peripheral pulses strong  ABDOMEN: soft, nontender, no " hepatosplenomegaly, no masses and bowel sounds normal  Genital: EXTERNAL GENITALIA: Normal appearing vulva without masses, tenderness or lesions. PERINEUM: normal and intact. URETHRAL MEATUS: normal VAGINA:  vagina with normal color and without discharge or lesions. CERVIX: normal appearing cervix without discharge or lesions. Non-friable. IUD strings visualized  MS: no gross musculoskeletal defects noted, no edema  NEURO: Normal strength and tone, mentation intact and speech normal  PSYCH: mentation appears normal, affect normal/bright      ASSESSMENT/PLAN:     Problem List Items Addressed This Visit        Digestive    Class 1 obesity due to excess calories without serious comorbidity with body mass index (BMI) of 31.0 to 31.9 in adult     She is commended for efforts and is working with a dietitian and a functional medicine clinic.              Musculoskeletal and Integumentary    Acne vulgaris     Followed by dermatology and treated with spironolactone and Retin-A           Relevant Medications    RETIN-A 0.025 % external cream    triamcinolone (KENALOG) 0.1 % external cream    clindamycin (CLEOCIN T) 1 % external lotion    Osteochondral lesion of talar dome     She is cleared for surgery if she decides to proceed with this.               Behavioral    Major depressive disorder, recurrent, in partial remission (H)     Doing well with bupropion.  Continue.  Follow-up in 6 months.           Relevant Medications    buPROPion (WELLBUTRIN XL) 300 MG 24 hr tablet       Other    ASCUS with positive high risk HPV cervical     Repeat Pap smear today due to abnormal findings in 2019             Other Visit Diagnoses     Cervical cancer screening    -  Primary    Relevant Orders    Pap Screen with HPV - recommended age 30 - 65 years (Completed)    HPV Hold (Lab Only) (Completed)    HPV High Risk Types DNA Cervical (Completed)    Loose stools        Relevant Orders    Leptin Quantitative (Completed)    Hemoglobin A1c  "(Completed)    CRP inflammation (Completed)    Homocysteine (Completed)    Insulin level (Completed)    Routine screening for STI (sexually transmitted infection)        Relevant Orders    Chlamydia trachomatis PCR - Clinic Collect (Completed)    Neisseria gonorrhoeae PCR - Clinic Collect (Completed)    Routine general medical examination at a health care facility                  COUNSELING:  Reviewed preventive health counseling, as reflected in patient instructions       Regular exercise       Healthy diet/nutrition    Estimated body mass index is 31.79 kg/m  as calculated from the following:    Height as of this encounter: 1.67 m (5' 5.75\").    Weight as of this encounter: 88.7 kg (195 lb 8 oz).        She reports that she has never smoked. She has never used smokeless tobacco.      Counseling Resources:  ATP IV Guidelines  Pooled Cohorts Equation Calculator  Breast Cancer Risk Calculator  BRCA-Related Cancer Risk Assessment: FHS-7 Tool  FRAX Risk Assessment  ICSI Preventive Guidelines  Dietary Guidelines for Americans, 2010  USDA's MyPlate  ASA Prophylaxis  Lung CA Screening    Lisa Arredondo NP  [facility]  "

## 2022-08-12 LAB
C TRACH DNA SPEC QL NAA+PROBE: NEGATIVE
N GONORRHOEA DNA SPEC QL NAA+PROBE: NEGATIVE

## 2022-08-15 LAB — LEPTIN SERPL-MCNC: 30.7 NG/ML

## 2022-08-16 ENCOUNTER — TRANSFERRED RECORDS (OUTPATIENT)
Dept: HEALTH INFORMATION MANAGEMENT | Facility: CLINIC | Age: 32
End: 2022-08-16

## 2022-08-16 LAB
BKR LAB AP GYN ADEQUACY: NORMAL
BKR LAB AP GYN INTERPRETATION: NORMAL
BKR LAB AP HPV REFLEX: NORMAL
BKR LAB AP LMP: NORMAL
BKR LAB AP PREVIOUS ABNL DX: NORMAL
BKR LAB AP PREVIOUS ABNORMAL: NORMAL
PATH REPORT.COMMENTS IMP SPEC: NORMAL
PATH REPORT.COMMENTS IMP SPEC: NORMAL
PATH REPORT.RELEVANT HX SPEC: NORMAL

## 2022-08-17 ENCOUNTER — TELEPHONE (OUTPATIENT)
Dept: INTERNAL MEDICINE | Facility: CLINIC | Age: 32
End: 2022-08-17

## 2022-08-17 DIAGNOSIS — Z01.818 ENCOUNTER FOR PREOPERATIVE ASSESSMENT: Primary | ICD-10-CM

## 2022-08-17 LAB — HCYS SERPL-SCNC: 7.7 UMOL/L (ref 4–12)

## 2022-08-17 NOTE — TELEPHONE ENCOUNTER
Yes, this exam could cover her for preoperative physical.  She would need 2 additional labs, I went ahead and placed the orders in her chart she just needs to schedule a lab only appointment.  She does not need to do an EKG.

## 2022-08-17 NOTE — TELEPHONE ENCOUNTER
Patient calling with a question to the provider.    Patient is wondering if the physical that was just done on 8/11/2022 could be used as a pre op appt for an upcoming surgery on 9/7/2022. Its within the 30 days.    Patient wondering if the physical could be used for the blood work section and if she needs to come in just for a MA EKG.    If a pre op appt is needed please help schedule.    Call Back  [phone]

## 2022-08-19 LAB
HUMAN PAPILLOMA VIRUS 16 DNA: NEGATIVE
HUMAN PAPILLOMA VIRUS 18 DNA: NEGATIVE
HUMAN PAPILLOMA VIRUS FINAL DIAGNOSIS: NORMAL
HUMAN PAPILLOMA VIRUS OTHER HR: NEGATIVE

## 2022-09-01 ENCOUNTER — LAB (OUTPATIENT)
Dept: LAB | Facility: CLINIC | Age: 32
End: 2022-09-01
Payer: COMMERCIAL

## 2022-09-01 DIAGNOSIS — Z01.818 ENCOUNTER FOR PREOPERATIVE ASSESSMENT: ICD-10-CM

## 2022-09-01 PROBLEM — M89.9 OSTEOCHONDRAL LESION OF TALAR DOME: Status: ACTIVE | Noted: 2022-09-01

## 2022-09-01 PROBLEM — M94.9 OSTEOCHONDRAL LESION OF TALAR DOME: Status: ACTIVE | Noted: 2022-09-01

## 2022-09-01 LAB
ANION GAP SERPL CALCULATED.3IONS-SCNC: 10 MMOL/L (ref 7–15)
BUN SERPL-MCNC: 10.4 MG/DL (ref 6–20)
CALCIUM SERPL-MCNC: 9.4 MG/DL (ref 8.6–10)
CHLORIDE SERPL-SCNC: 105 MMOL/L (ref 98–107)
CREAT SERPL-MCNC: 0.82 MG/DL (ref 0.51–0.95)
DEPRECATED HCO3 PLAS-SCNC: 23 MMOL/L (ref 22–29)
ERYTHROCYTE [DISTWIDTH] IN BLOOD BY AUTOMATED COUNT: 12.4 % (ref 10–15)
GFR SERPL CREATININE-BSD FRML MDRD: >90 ML/MIN/1.73M2
GLUCOSE SERPL-MCNC: 91 MG/DL (ref 70–99)
HCG UR QL: NEGATIVE
HCT VFR BLD AUTO: 38.1 % (ref 35–47)
HGB BLD-MCNC: 13 G/DL (ref 11.7–15.7)
MCH RBC QN AUTO: 30.2 PG (ref 26.5–33)
MCHC RBC AUTO-ENTMCNC: 34.1 G/DL (ref 31.5–36.5)
MCV RBC AUTO: 88 FL (ref 78–100)
PLATELET # BLD AUTO: 298 10E3/UL (ref 150–450)
POTASSIUM SERPL-SCNC: 4.2 MMOL/L (ref 3.4–5.3)
RBC # BLD AUTO: 4.31 10E6/UL (ref 3.8–5.2)
SODIUM SERPL-SCNC: 138 MMOL/L (ref 136–145)
WBC # BLD AUTO: 6.7 10E3/UL (ref 4–11)

## 2022-09-01 PROCEDURE — 85027 COMPLETE CBC AUTOMATED: CPT

## 2022-09-01 PROCEDURE — 36415 COLL VENOUS BLD VENIPUNCTURE: CPT

## 2022-09-01 PROCEDURE — 81025 URINE PREGNANCY TEST: CPT

## 2022-09-01 PROCEDURE — 80048 BASIC METABOLIC PNL TOTAL CA: CPT

## 2022-09-07 ENCOUNTER — TRANSFERRED RECORDS (OUTPATIENT)
Dept: HEALTH INFORMATION MANAGEMENT | Facility: CLINIC | Age: 32
End: 2022-09-07

## 2022-09-11 ENCOUNTER — HEALTH MAINTENANCE LETTER (OUTPATIENT)
Age: 32
End: 2022-09-11

## 2022-09-22 ENCOUNTER — TRANSFERRED RECORDS (OUTPATIENT)
Dept: HEALTH INFORMATION MANAGEMENT | Facility: CLINIC | Age: 32
End: 2022-09-22

## 2022-10-27 ENCOUNTER — TRANSFERRED RECORDS (OUTPATIENT)
Dept: HEALTH INFORMATION MANAGEMENT | Facility: CLINIC | Age: 32
End: 2022-10-27

## 2022-12-13 ENCOUNTER — TRANSFERRED RECORDS (OUTPATIENT)
Dept: HEALTH INFORMATION MANAGEMENT | Facility: CLINIC | Age: 32
End: 2022-12-13

## 2023-01-26 ENCOUNTER — OFFICE VISIT (OUTPATIENT)
Dept: INTERNAL MEDICINE | Facility: CLINIC | Age: 33
End: 2023-01-26
Payer: COMMERCIAL

## 2023-01-26 VITALS
SYSTOLIC BLOOD PRESSURE: 110 MMHG | RESPIRATION RATE: 18 BRPM | WEIGHT: 205.6 LBS | TEMPERATURE: 99.1 F | HEIGHT: 66 IN | BODY MASS INDEX: 33.04 KG/M2 | DIASTOLIC BLOOD PRESSURE: 74 MMHG | HEART RATE: 85 BPM | OXYGEN SATURATION: 99 %

## 2023-01-26 DIAGNOSIS — E66.811 CLASS 1 OBESITY DUE TO EXCESS CALORIES WITHOUT SERIOUS COMORBIDITY WITH BODY MASS INDEX (BMI) OF 33.0 TO 33.9 IN ADULT: ICD-10-CM

## 2023-01-26 DIAGNOSIS — E66.09 CLASS 1 OBESITY DUE TO EXCESS CALORIES WITHOUT SERIOUS COMORBIDITY WITH BODY MASS INDEX (BMI) OF 33.0 TO 33.9 IN ADULT: ICD-10-CM

## 2023-01-26 DIAGNOSIS — F33.41 MAJOR DEPRESSIVE DISORDER, RECURRENT, IN PARTIAL REMISSION (H): Primary | ICD-10-CM

## 2023-01-26 PROCEDURE — 99213 OFFICE O/P EST LOW 20 MIN: CPT | Mod: 25 | Performed by: NURSE PRACTITIONER

## 2023-01-26 PROCEDURE — 90471 IMMUNIZATION ADMIN: CPT | Performed by: NURSE PRACTITIONER

## 2023-01-26 PROCEDURE — 90686 IIV4 VACC NO PRSV 0.5 ML IM: CPT | Performed by: NURSE PRACTITIONER

## 2023-01-26 RX ORDER — BUPROPION HYDROCHLORIDE 300 MG/1
300 TABLET ORAL EVERY MORNING
Qty: 90 TABLET | Refills: 1 | Status: SHIPPED | OUTPATIENT
Start: 2023-01-26 | End: 2023-08-15

## 2023-01-26 ASSESSMENT — ANXIETY QUESTIONNAIRES
7. FEELING AFRAID AS IF SOMETHING AWFUL MIGHT HAPPEN: SEVERAL DAYS
8. IF YOU CHECKED OFF ANY PROBLEMS, HOW DIFFICULT HAVE THESE MADE IT FOR YOU TO DO YOUR WORK, TAKE CARE OF THINGS AT HOME, OR GET ALONG WITH OTHER PEOPLE?: SOMEWHAT DIFFICULT
GAD7 TOTAL SCORE: 10
2. NOT BEING ABLE TO STOP OR CONTROL WORRYING: MORE THAN HALF THE DAYS
IF YOU CHECKED OFF ANY PROBLEMS ON THIS QUESTIONNAIRE, HOW DIFFICULT HAVE THESE PROBLEMS MADE IT FOR YOU TO DO YOUR WORK, TAKE CARE OF THINGS AT HOME, OR GET ALONG WITH OTHER PEOPLE: SOMEWHAT DIFFICULT
6. BECOMING EASILY ANNOYED OR IRRITABLE: SEVERAL DAYS
3. WORRYING TOO MUCH ABOUT DIFFERENT THINGS: MORE THAN HALF THE DAYS
1. FEELING NERVOUS, ANXIOUS, OR ON EDGE: MORE THAN HALF THE DAYS
GAD7 TOTAL SCORE: 10
4. TROUBLE RELAXING: SEVERAL DAYS
7. FEELING AFRAID AS IF SOMETHING AWFUL MIGHT HAPPEN: SEVERAL DAYS
GAD7 TOTAL SCORE: 10
5. BEING SO RESTLESS THAT IT IS HARD TO SIT STILL: SEVERAL DAYS

## 2023-01-26 NOTE — LETTER
My Depression Action Plan  Name: Janny Kitchen   Date of Birth 1990  Date: 1/26/2023    My doctor: Lisa Arredondo   My clinic: [address] [address]  [phone]          GREEN    ZONE   Good Control    What it looks like:     Things are going generally well. You have normal ups and downs. You may even feel depressed from time to time, but bad moods usually last less than a day.   What you need to do:  1. Continue to care for yourself (see self care plan)  2. Check your depression survival kit and update it as needed  3. Follow your physician s recommendations including any medication.  4. Do not stop taking medication unless you consult with your physician first.           YELLOW         ZONE Getting Worse    What it looks like:     Depression is starting to interfere with your life.     It may be hard to get out of bed; you may be starting to isolate yourself from others.    Symptoms of depression are starting to last most all day and this has happened for several days.     You may have suicidal thoughts but they are not constant.   What you need to do:     1. Call your care team. Your response to treatment will improve if you keep your care team informed of your progress. Yellow periods are signs an adjustment may need to be made.     2. Continue your self-care.  Just get dressed and ready for the day.  Don't give yourself time to talk yourself out of it.    3. Talk to someone in your support network.    4. Open up your Depression Self-Care Plan/Wellness Kit.           RED    ZONE Medical Alert - Get Help    What it looks like:     Depression is seriously interfering with your life.     You may experience these or other symptoms: You can t get out of bed most days, can t work or engage in other necessary activities, you have trouble taking care of basic hygiene, or basic responsibilities, thoughts of suicide or death that will  not go away, self-injurious behavior.     What you need to do:  1. Call your care team and request a same-day appointment. If they are not available (weekends or after hours) call your local crisis line, emergency room or 911.          Depression Self-Care Plan / Wellness Kit    Many people find that medication and therapy are helpful treatments for managing depression. In addition, making small changes to your everyday life can help to boost your mood and improve your wellbeing. Below are some tips for you to consider. Be sure to talk with your medical provider and/or behavioral health consultant if your symptoms are worsening or not improving.     Sleep   Sleep hygiene  means all of the habits that support good, restful sleep. It includes maintaining a consistent bedtime and wake time, using your bedroom only for sleeping or sex, and keeping the bedroom dark and free of distractions like a computer, smartphone, or television.     Develop a Healthy Routine  Maintain good hygiene. Get out of bed in the morning, make your bed, brush your teeth, take a shower, and get dressed. Don t spend too much time viewing media that makes you feel stressed. Find time to relax each day.    Exercise  Get some form of exercise every day. This will help reduce pain and release endorphins, the  feel good  chemicals in your brain. It can be as simple as just going for a walk or doing some gardening, anything that will get you moving.      Diet  Strive to eat healthy foods, including fruits and vegetables. Drink plenty of water. Avoid excessive sugar, caffeine, alcohol, and other mood-altering substances.     Stay Connected with Others  Stay in touch with friends and family members.    Manage Your Mood  Try deep breathing, massage therapy, biofeedback, or meditation. Take part in fun activities when you can. Try to find something to smile about each day.     Psychotherapy  Be open to working with a therapist if your provider recommends  it.     Medication  Be sure to take your medication as prescribed. Most anti-depressants need to be taken every day. It usually takes several weeks for medications to work. Not all medicines work for all people. It is important to follow-up with your provider to make sure you have a treatment plan that is working for you. Do not stop your medication abruptly without first discussing it with your provider.    Crisis Resources   These hotlines are for both adults and children. They and are open 24 hours a day, 7 days a week unless noted otherwise.      National Suicide Prevention Lifeline   988 or 3-560-113-ZJEF (6465)      Crisis Text Line    www.crisistextline.org  Text HOME to 172383 from anywhere in the [place], anytime, about any type of crisis. A live, trained crisis counselor will receive the text and respond quickly.      [place] Lifeline for LGBTQ Youth  A national crisis intervention and suicide lifeline for LGBTQ youth under 25. Provides a safe place to talk without judgement. Call 1-725.283.4232; text START to 059709 or visit www.thetrevorproject.org to talk to a trained counselor.      For [facility] crisis numbers, visit the [facility] website at:  https://[place].gov/dhs/people-we-serve/adults/health-care/mental-health/resources/crisis-contacts.jsp

## 2023-01-26 NOTE — PROGRESS NOTES
"  Assessment & Plan   Problem List Items Addressed This Visit        Digestive    Class 1 obesity due to excess calories without serious comorbidity with body mass index (BMI) of 33.0 to 33.9 in adult     Still struggling with weight management. High leptin level on prior lab results and strong family history of obesity. She is very frustrated that she is not seeing any results with lifestyle changes. Reviewed options including medication- GLP-1 inhibitor in particularly discussed. Also reviewed option to see bariatric clinic and the various options available- she would like to look into this. Referral placed.             Behavioral    Major depressive disorder, recurrent, in partial remission (H) - Primary     Continue with bupropion, no changes          Relevant Medications    buPROPion (WELLBUTRIN XL) 300 MG 24 hr tablet           BMI:   Estimated body mass index is 33.18 kg/m  as calculated from the following:    Height as of this encounter: 1.676 m (5' 6\").    Weight as of this encounter: 93.3 kg (205 lb 9.6 oz).       Return in about 6 months (around 7/26/2023) for Routine preventive.    Lisa Arredondo NP  [facility]    Maddie Soliman is a 32 year old, presenting for the following health issues:  Follow Up (Pt states kind of so so on the medication) and weight concern    SHERLY/depression- Mood is up and down. Situational factors have impacted her mood.     Ankle pain s/p surgery in September- she had some pain after the surgery but is turning a corner now that she has started PT. She was initially disappointed that the surgery did not immediately resolve the pain but PT has been making progress.     Weight- She is no longer seeing the functional medicine provider or nutritionist. She continues to follow a healthy diet and is walking on a treadmill that she recently purchased. She is very frustrated that she is not losing weight.       History of Present Illness       Mental Health " "Follow-up:  Patient presents to follow-up on Depression & Anxiety.Patient's depression since last visit has been:  Good  The patient is having other symptoms associated with depression.  Patient's anxiety since last visit has been:  Good  The patient is not having other symptoms associated with anxiety.  Any significant life events: financial concerns  Patient is feeling anxious or having panic attacks.  Patient has no concerns about alcohol or drug use.    Reason for visit:  Medication follow-up and weight    She eats 2-3 servings of fruits and vegetables daily.She consumes 0 sweetened beverage(s) daily.She exercises with enough effort to increase her heart rate 30 to 60 minutes per day.  She exercises with enough effort to increase her heart rate 5 days per week.   She is taking medications regularly.    Today's PHQ-9         PHQ-9 Total Score: 5    PHQ-9 Q9 Thoughts of better off dead/self-harm past 2 weeks :   Not at all    How difficult have these problems made it for you to do your work, take care of things at home, or get along with other people: Somewhat difficult  Today's SHERLY-7 Score: 10           Objective    /74 (BP Location: Right arm, Patient Position: Sitting, Cuff Size: Adult Regular)   Pulse 85   Temp 99.1  F (37.3  C) (Oral)   Resp 18   Ht 1.676 m (5' 6\")   Wt 93.3 kg (205 lb 9.6 oz)   SpO2 99%   BMI 33.18 kg/m    Body mass index is 33.18 kg/m .     Wt Readings from Last 5 Encounters:   01/26/23 93.3 kg (205 lb 9.6 oz)   08/11/22 88.7 kg (195 lb 8 oz)   12/08/21 88.9 kg (196 lb)   09/02/21 83.1 kg (183 lb 3.2 oz)   05/13/21 90.7 kg (200 lb)         Physical Exam   GENERAL: alert and no distress                  "

## 2023-01-26 NOTE — ASSESSMENT & PLAN NOTE
Still struggling with weight management. High leptin level on prior lab results and strong family history of obesity. She is very frustrated that she is not seeing any results with lifestyle changes. Reviewed options including medication- GLP-1 inhibitor in particularly discussed. Also reviewed option to see bariatric clinic and the various options available- she would like to look into this. Referral placed.

## 2023-03-23 ENCOUNTER — TRANSFERRED RECORDS (OUTPATIENT)
Dept: HEALTH INFORMATION MANAGEMENT | Facility: CLINIC | Age: 33
End: 2023-03-23

## 2023-08-14 ASSESSMENT — PATIENT HEALTH QUESTIONNAIRE - PHQ9
SUM OF ALL RESPONSES TO PHQ QUESTIONS 1-9: 8
SUM OF ALL RESPONSES TO PHQ QUESTIONS 1-9: 8
10. IF YOU CHECKED OFF ANY PROBLEMS, HOW DIFFICULT HAVE THESE PROBLEMS MADE IT FOR YOU TO DO YOUR WORK, TAKE CARE OF THINGS AT HOME, OR GET ALONG WITH OTHER PEOPLE: SOMEWHAT DIFFICULT

## 2023-08-15 ENCOUNTER — OFFICE VISIT (OUTPATIENT)
Dept: INTERNAL MEDICINE | Facility: CLINIC | Age: 33
End: 2023-08-15
Payer: COMMERCIAL

## 2023-08-15 VITALS
TEMPERATURE: 98.8 F | DIASTOLIC BLOOD PRESSURE: 87 MMHG | HEIGHT: 66 IN | BODY MASS INDEX: 31.82 KG/M2 | SYSTOLIC BLOOD PRESSURE: 123 MMHG | RESPIRATION RATE: 20 BRPM | WEIGHT: 198 LBS | OXYGEN SATURATION: 97 % | HEART RATE: 107 BPM
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
## 2023-08-15 DIAGNOSIS — L70.0 ACNE VULGARIS: ICD-10-CM

## 2023-08-15 DIAGNOSIS — F33.41 MAJOR DEPRESSIVE DISORDER, RECURRENT, IN PARTIAL REMISSION (H): ICD-10-CM

## 2023-08-15 PROCEDURE — 99213 OFFICE O/P EST LOW 20 MIN: CPT | Performed by: NURSE PRACTITIONER

## 2023-08-15 ASSESSMENT — PAIN SCALES - GENERAL: PAINLEVEL: MILD PAIN (2)

## 2023-08-15 NOTE — PROGRESS NOTES
"  Assessment & Plan   Problem List Items Addressed This Visit          Musculoskeletal and Integumentary    Acne vulgaris     Currently doing well without spironolactone and using topical products only             Behavioral    Major depressive disorder, recurrent, in partial remission (H)     Doing well without medication at this time. Follow up if recurrence of symptoms.           Other Visit Diagnoses       Dog bite, initial encounter    -  Primary    Relevant Medications    amoxicillin-clavulanate (AUGMENTIN) 875-125 MG tablet          No signs of infection but will empirically cover dog bite wound with augmentin as prescribed above. Tetanus vaccine is UTD. Reviewed return precautions. If she develops signs of infection while on the antibiotic, would need ED evaluation.          BMI:   Estimated body mass index is 31.96 kg/m  as calculated from the following:    Height as of this encounter: 1.676 m (5' 6\").    Weight as of this encounter: 89.8 kg (198 lb).       Lisa Arredondo NP  [facility] GEORGETTE Soliman is a 33 year old, presenting for the following health issues:  Dog Bite        8/15/2023    12:44 PM   Additional Questions   Roomed by Pro CAMACHO   Accompanied by N/A       History of Present Illness       Reason for visit:  Dog bite on hands, general follow-up.  Symptom onset:  1-3 days ago  Symptoms include:  Pain, swelling and bruising in hands.  Symptom intensity:  Moderate  Symptom progression:  Improving  Had these symptoms before:  No    She eats 2-3 servings of fruits and vegetables daily.She consumes 1 sweetened beverage(s) daily.She exercises with enough effort to increase her heart rate 10 to 19 minutes per day.  She exercises with enough effort to increase her heart rate 3 or less days per week.   She is taking medications regularly.     Her dog bit both thumbs on 8/12 (3 days ago) when the dog appeared to be choking and she tried to do a finger sweep in his mouth to " "remove an obstruction. The dog is current on vaccinations. The puncture wounds appear to be healing. She has some tingling in both thumbs but still has sensation. Full ROM in the right thumb. Left thumb flexion is limited by swelling.     Acne has been doing well without spironolactone. She is using topicals to treat acne.         Objective    /87 (BP Location: Right arm, Patient Position: Sitting, Cuff Size: Adult Regular)   Pulse 107   Temp 98.8  F (37.1  C) (Oral)   Resp 20   Ht 1.676 m (5' 6\")   Wt 89.8 kg (198 lb)   LMP  (LMP Unknown)   SpO2 97%   BMI 31.96 kg/m    Body mass index is 31.96 kg/m .    Wt Readings from Last 5 Encounters:   08/15/23 89.8 kg (198 lb)   01/26/23 93.3 kg (205 lb 9.6 oz)   08/11/22 88.7 kg (195 lb 8 oz)   12/08/21 88.9 kg (196 lb)   09/02/21 83.1 kg (183 lb 3.2 oz)       Physical Exam   GENERAL: Alert and no distress  SKIN: puncture wound on bilateral dorsum of the thumbs. Left thumb puncture liliya on the palmar aspect of the thumb as well. Left thumb is swollen with limited thumb flexion. Right thumb normal ROM. Brisk capillary refill b/l thumbs. No erythema                  "

## 2023-10-07 ENCOUNTER — HEALTH MAINTENANCE LETTER (OUTPATIENT)
Age: 33
End: 2023-10-07

## 2023-11-20 ENCOUNTER — OFFICE VISIT (OUTPATIENT)
Dept: INTERNAL MEDICINE | Facility: CLINIC | Age: 33
End: 2023-11-20
Payer: COMMERCIAL

## 2023-11-20 VITALS
HEART RATE: 81 BPM | BODY MASS INDEX: 31.93 KG/M2 | RESPIRATION RATE: 18 BRPM | TEMPERATURE: 98.2 F | SYSTOLIC BLOOD PRESSURE: 112 MMHG | OXYGEN SATURATION: 97 % | HEIGHT: 66 IN | DIASTOLIC BLOOD PRESSURE: 82 MMHG | WEIGHT: 198.7 LBS

## 2023-11-20 DIAGNOSIS — E66.811 CLASS 1 OBESITY DUE TO EXCESS CALORIES WITHOUT SERIOUS COMORBIDITY WITH BODY MASS INDEX (BMI) OF 33.0 TO 33.9 IN ADULT: ICD-10-CM

## 2023-11-20 DIAGNOSIS — E66.811 CLASS 1 OBESITY DUE TO EXCESS CALORIES WITH SERIOUS COMORBIDITY AND BODY MASS INDEX (BMI) OF 32.0 TO 32.9 IN ADULT: ICD-10-CM

## 2023-11-20 DIAGNOSIS — G43.109 MIGRAINE WITH AURA AND WITHOUT STATUS MIGRAINOSUS, NOT INTRACTABLE: ICD-10-CM

## 2023-11-20 DIAGNOSIS — E66.09 CLASS 1 OBESITY DUE TO EXCESS CALORIES WITHOUT SERIOUS COMORBIDITY WITH BODY MASS INDEX (BMI) OF 33.0 TO 33.9 IN ADULT: ICD-10-CM

## 2023-11-20 DIAGNOSIS — Z13.220 LIPID SCREENING: ICD-10-CM

## 2023-11-20 DIAGNOSIS — Z00.00 ROUTINE GENERAL MEDICAL EXAMINATION AT A HEALTH CARE FACILITY: Primary | ICD-10-CM

## 2023-11-20 DIAGNOSIS — R53.83 OTHER FATIGUE: ICD-10-CM

## 2023-11-20 DIAGNOSIS — L70.0 ACNE VULGARIS: ICD-10-CM

## 2023-11-20 DIAGNOSIS — F41.1 GAD (GENERALIZED ANXIETY DISORDER): ICD-10-CM

## 2023-11-20 DIAGNOSIS — E66.09 CLASS 1 OBESITY DUE TO EXCESS CALORIES WITH SERIOUS COMORBIDITY AND BODY MASS INDEX (BMI) OF 32.0 TO 32.9 IN ADULT: ICD-10-CM

## 2023-11-20 DIAGNOSIS — F33.41 MAJOR DEPRESSIVE DISORDER, RECURRENT, IN PARTIAL REMISSION (H): ICD-10-CM

## 2023-11-20 PROCEDURE — 90471 IMMUNIZATION ADMIN: CPT | Performed by: NURSE PRACTITIONER

## 2023-11-20 PROCEDURE — 90480 ADMN SARSCOV2 VAC 1/ONLY CMP: CPT | Performed by: NURSE PRACTITIONER

## 2023-11-20 PROCEDURE — 91320 SARSCV2 VAC 30MCG TRS-SUC IM: CPT | Performed by: NURSE PRACTITIONER

## 2023-11-20 PROCEDURE — 90686 IIV4 VACC NO PRSV 0.5 ML IM: CPT | Performed by: NURSE PRACTITIONER

## 2023-11-20 PROCEDURE — 99214 OFFICE O/P EST MOD 30 MIN: CPT | Mod: 25 | Performed by: NURSE PRACTITIONER

## 2023-11-20 PROCEDURE — 99395 PREV VISIT EST AGE 18-39: CPT | Mod: 25 | Performed by: NURSE PRACTITIONER

## 2023-11-20 RX ORDER — SUMATRIPTAN 100 MG/1
100 TABLET, FILM COATED ORAL
Qty: 15 TABLET | Refills: 1 | Status: SHIPPED | OUTPATIENT
Start: 2023-11-20

## 2023-11-20 RX ORDER — SULFACETAMIDE SODIUM, SULFUR 100; 50 MG/G; MG/G
EMULSION TOPICAL
COMMUNITY
Start: 2023-03-01

## 2023-11-20 ASSESSMENT — ENCOUNTER SYMPTOMS
NERVOUS/ANXIOUS: 1
NAUSEA: 0
SHORTNESS OF BREATH: 0
ABDOMINAL PAIN: 0
SORE THROAT: 0
BREAST MASS: 0
FREQUENCY: 0
HEARTBURN: 0
PARESTHESIAS: 0
PALPITATIONS: 0
MYALGIAS: 0
HEMATOCHEZIA: 0
EYE PAIN: 0
HEADACHES: 1
DIARRHEA: 0
FEVER: 0
CHILLS: 0
ARTHRALGIAS: 1
DYSURIA: 0
DIZZINESS: 0
HEMATURIA: 0
WEAKNESS: 0
COUGH: 0
CONSTIPATION: 0
JOINT SWELLING: 0

## 2023-11-20 NOTE — PROGRESS NOTES
SUBJECTIVE:   Janny is a 33 year old, presenting for the following:  Physical        11/20/2023    12:10 PM   Additional Questions   Roomed by Abhilash Kerr   Accompanied by N/A       Healthy Habits:     Getting at least 3 servings of Calcium per day:  Yes    Bi-annual eye exam:  Yes    Dental care twice a year:  Yes    Sleep apnea or symptoms of sleep apnea:  Daytime drowsiness and Excessive snoring    Diet:  Other    Frequency of exercise:  1 day/week    Duration of exercise:  Less than 15 minutes    Taking medications regularly:  Yes    Medication side effects:  Not applicable    Additional concerns today:  Yes    Fatigue- for 1-2 months she has been really fatigued. She goes to bed at 7:30 and wakes up around 3:30am when her boyfriend gets up for about an hour and then falls asleep for about an hour. She does not feel like she has been depressed but does notice anxiety symptoms. She knows that she snores, she has gasps and wakes herself up.    SHERLY and depression- finds herself feeling more overwhelmed recently and has more anxiety than depression symptoms.     Weight- Lost about 15 at the beginning of the year but hasn't had any further success. She is very frustrated about her weight.     Migraine- She had a really debilitating migraine when she was in vacation. Symptoms began with vision changes and then she developed facial numbness and in the hands. She has had 2 migraines in the past few months. She took Excedrin and sumitriptan 50, the latter was ineffective. She took a friend's dose of a triptan, this was ineffective.     Today's PHQ-9 Score:       11/20/2023     8:50 AM   PHQ-9 SCORE   PHQ-9 Total Score MyChart 8 (Mild depression)   PHQ-9 Total Score 8         1/26/2023     9:09 AM 8/14/2023     1:15 PM 11/20/2023     8:50 AM   PHQ   PHQ-9 Total Score 5 8 8   Q9: Thoughts of better off dead/self-harm past 2 weeks Not at all Not at all Not at all             Social History     Tobacco Use    Smoking status:  Never    Smokeless tobacco: Never   Substance Use Topics    Alcohol use: Not Currently     Alcohol/week: 3.0 standard drinks of alcohol     Comment: Alcoholic Drinks/day: socially            11/20/2023     8:53 AM   Alcohol Use   Prescreen: >3 drinks/day or >7 drinks/week? No     Reviewed orders with patient.  Reviewed health maintenance and updated orders accordingly - Yes      Breast Cancer Screening:    FHS-7:       8/11/2022    10:40 AM 11/20/2023     8:54 AM   Breast CA Risk Assessment (FHS-7)   Did any of your first-degree relatives have breast or ovarian cancer? No No   Did any of your relatives have bilateral breast cancer? No No   Did any man in your family have breast cancer? No No   Did any woman in your family have breast and ovarian cancer? No No   Did any woman in your family have breast cancer before age 50 y? No No   Do you have 2 or more relatives with breast and/or ovarian cancer? No No   Do you have 2 or more relatives with breast and/or bowel cancer? No Unknown       Pertinent mammograms are reviewed under the imaging tab.          Latest Ref Rng & Units 8/11/2022    11:21 AM 5/13/2021     2:19 PM 3/5/2019    12:00 AM   PAP / HPV   PAP  Negative for Intraepithelial Lesion or Malignancy (NILM)  Negative for squamous intraepithelial lesion or malignancy  Electronically signed by Moraima Fonseca CT (ASCP) on 5/19/2021 at  2:59 PM       HPV 16 DNA Negative Negative  Negative     HPV 18 DNA Negative Negative  Negative     HPV_EXT - HISTORICAL    See Scanned Report    Other HR HPV Negative Negative  Negative       Reviewed and updated as needed this visit by clinical staff   Tobacco  Allergies  Meds  Problems  Med Hx  Surg Hx  Fam Hx          Reviewed and updated as needed this visit by Provider   Tobacco  Allergies  Meds  Problems  Med Hx  Surg Hx  Fam Hx           Review of Systems   Constitutional:  Negative for chills and fever.   HENT:  Negative for congestion, ear pain, hearing  "loss and sore throat.    Eyes:  Negative for pain and visual disturbance.   Respiratory:  Negative for cough and shortness of breath.    Cardiovascular:  Negative for chest pain, palpitations and peripheral edema.   Gastrointestinal:  Negative for abdominal pain, constipation, diarrhea, heartburn, hematochezia and nausea.   Breasts:  Negative for tenderness, breast mass and discharge.   Genitourinary:  Positive for vaginal discharge. Negative for dysuria, frequency, genital sores, hematuria, pelvic pain, urgency and vaginal bleeding.   Musculoskeletal:  Positive for arthralgias. Negative for joint swelling and myalgias.   Skin:  Negative for rash.   Neurological:  Positive for headaches. Negative for dizziness, weakness and paresthesias.   Psychiatric/Behavioral:  Negative for mood changes. The patient is nervous/anxious.           OBJECTIVE:   /82 (BP Location: Right arm, Patient Position: Sitting, Cuff Size: Adult Regular)   Pulse 81   Temp 98.2  F (36.8  C) (Oral)   Resp 18   Ht 1.676 m (5' 6\")   Wt 90.1 kg (198 lb 11.2 oz)   SpO2 97%   BMI 32.07 kg/m      Wt Readings from Last 5 Encounters:   11/20/23 90.1 kg (198 lb 11.2 oz)   08/15/23 89.8 kg (198 lb)   01/26/23 93.3 kg (205 lb 9.6 oz)   08/11/22 88.7 kg (195 lb 8 oz)   12/08/21 88.9 kg (196 lb)     ______________________________________________________________________    STOPBANG RONY ASSESSMENT    1.  Do you snore loudly (louder than talking or loud enough to be heard through closed doors):  yes    2.  Do you often feel tired, fatigued, or sleepy during the day:  yes    3.  Has anyone observed you stop breathing during sleep:  no    4.  Do you have or are you being treated for high blood pressure:  no    5.  Body mass index > 35:  Body mass index is 32.07 kg/m .    6.  Age > 50:  33 year old     7.  Neck circumference greater than 40 cm:  39    8.  Gender male:  female     Total score:  2      A score of 3 or greater indicates a risk for sleep " apnea (84% sensitivity).  A score of 5 or greater is more predictive of clinically relevant moderate to severe obstructive sleep apnea.    ______________________________________________________________________      Physical Exam  GENERAL: healthy, alert and no distress  EYES: Eyes grossly normal to inspection, conjunctivae and sclerae normal  HENT: ear canals and TM's normal, mouth without ulcers or lesions  NECK: no adenopathy, no asymmetry, masses, or scars and thyroid normal to palpation  RESP: lungs clear to auscultation - no rales, rhonchi or wheezes  CV: regular rate and rhythm, normal S1 S2, no S3 or S4, no murmur  ABDOMEN: soft, nontender, no hepatosplenomegaly, no masses and bowel sounds normal  MS: no gross musculoskeletal defects noted, no edema  NEURO: Normal strength and tone, mentation intact and speech normal  PSYCH: mentation appears normal, affect normal/bright        ASSESSMENT/PLAN:     Problem List Items Addressed This Visit       SHERLY (generalized anxiety disorder)      Consider escitalopram as a medication option. She is not interested in starting anything at this time          Major depressive disorder, recurrent, in partial remission (H24)     Currently in remission         Migraine with aura and without status migrainosus, not intractable     Increase Imitrex to 100 mg take as needed.  She will send me a [employer] message with the alternative triptan that she has tried.         Relevant Medications    SUMAtriptan (IMITREX) 100 MG tablet    Class 1 obesity due to excess calories without serious comorbidity with body mass index (BMI) of 33.0 to 33.9 in adult     Still struggling with weight management. High leptin level on prior lab results and strong family history of obesity. She is very frustrated that she is not seeing any results with lifestyle changes. Reviewed options including medication- GLP-1 inhibitor in particularly discussed.  She is open to looking into it although she does want to  "think about this a bit further.  I sent Wegovy 0.25 mg to her pharmacy.  If she starts the medication, she should send a message in about 3 weeks after starting the medication to let me know how she is tolerating it and we would plan to increase to 0.5 mg if going well with an office follow-up 2 months after starting the medication         Relevant Medications    Semaglutide-Weight Management (WEGOVY) 0.25 MG/0.5ML pen    Acne vulgaris     Followed by dermatology and recently improved         Relevant Medications    Sulfacetamide Sodium-Sulfur 10-5 % LIQD     Other Visit Diagnoses       Routine general medical examination at a health care facility    -  Primary    Lipid screening        Relevant Orders    Lipid panel reflex to direct LDL Fasting    Basic metabolic panel    Other fatigue        Relevant Orders    TSH with free T4 reflex    CBC with platelets    Vitamin D Deficiency    Vitamin B12    Iron and iron binding capacity    Ferritin    Class 1 obesity due to excess calories with serious comorbidity and body mass index (BMI) of 32.0 to 32.9 in adult        Relevant Medications    Semaglutide-Weight Management (WEGOVY) 0.25 MG/0.5ML pen              COUNSELING:  Reviewed preventive health counseling, as reflected in patient instructions      BMI:   Estimated body mass index is 32.07 kg/m  as calculated from the following:    Height as of this encounter: 1.676 m (5' 6\").    Weight as of this encounter: 90.1 kg (198 lb 11.2 oz).   Weight management plan: reviewed options as noted above      She reports that she has never smoked. She has never used smokeless tobacco.      Lisa Arredondo NP  [facility]      "

## 2023-11-20 NOTE — ASSESSMENT & PLAN NOTE
Consider escitalopram as a medication option. She is not interested in starting anything at this time

## 2023-11-21 NOTE — ASSESSMENT & PLAN NOTE
Still struggling with weight management. High leptin level on prior lab results and strong family history of obesity. She is very frustrated that she is not seeing any results with lifestyle changes. Reviewed options including medication- GLP-1 inhibitor in particularly discussed.  She is open to looking into it although she does want to think about this a bit further.  I sent Wegovy 0.25 mg to her pharmacy.  If she starts the medication, she should send a message in about 3 weeks after starting the medication to let me know how she is tolerating it and we would plan to increase to 0.5 mg if going well with an office follow-up 2 months after starting the medication

## 2023-11-21 NOTE — ASSESSMENT & PLAN NOTE
Increase Imitrex to 100 mg take as needed.  She will send me a [employer] message with the alternative triptan that she has tried.

## 2023-12-06 ENCOUNTER — LAB (OUTPATIENT)
Dept: LAB | Facility: CLINIC | Age: 33
End: 2023-12-06
Payer: COMMERCIAL

## 2023-12-06 DIAGNOSIS — Z13.220 LIPID SCREENING: ICD-10-CM

## 2023-12-06 DIAGNOSIS — R53.83 OTHER FATIGUE: ICD-10-CM

## 2023-12-06 LAB
ANION GAP SERPL CALCULATED.3IONS-SCNC: 10 MMOL/L (ref 7–15)
BUN SERPL-MCNC: 14.3 MG/DL (ref 6–20)
CALCIUM SERPL-MCNC: 9.4 MG/DL (ref 8.6–10)
CHLORIDE SERPL-SCNC: 103 MMOL/L (ref 98–107)
CHOLEST SERPL-MCNC: 160 MG/DL
CREAT SERPL-MCNC: 0.73 MG/DL (ref 0.51–0.95)
DEPRECATED HCO3 PLAS-SCNC: 23 MMOL/L (ref 22–29)
EGFRCR SERPLBLD CKD-EPI 2021: >90 ML/MIN/1.73M2
ERYTHROCYTE [DISTWIDTH] IN BLOOD BY AUTOMATED COUNT: 12.1 % (ref 10–15)
FASTING STATUS PATIENT QL REPORTED: YES
FERRITIN SERPL-MCNC: 74 NG/ML (ref 6–175)
GLUCOSE SERPL-MCNC: 94 MG/DL (ref 70–99)
HCT VFR BLD AUTO: 39.7 % (ref 35–47)
HDLC SERPL-MCNC: 42 MG/DL
HGB BLD-MCNC: 14 G/DL (ref 11.7–15.7)
IRON BINDING CAPACITY (ROCHE): 300 UG/DL (ref 240–430)
IRON SATN MFR SERPL: 26 % (ref 15–46)
IRON SERPL-MCNC: 77 UG/DL (ref 37–145)
LDLC SERPL CALC-MCNC: 103 MG/DL
MCH RBC QN AUTO: 29.7 PG (ref 26.5–33)
MCHC RBC AUTO-ENTMCNC: 35.3 G/DL (ref 31.5–36.5)
MCV RBC AUTO: 84 FL (ref 78–100)
NONHDLC SERPL-MCNC: 118 MG/DL
PLATELET # BLD AUTO: 287 10E3/UL (ref 150–450)
POTASSIUM SERPL-SCNC: 4.4 MMOL/L (ref 3.4–5.3)
RBC # BLD AUTO: 4.72 10E6/UL (ref 3.8–5.2)
SODIUM SERPL-SCNC: 136 MMOL/L (ref 135–145)
TRIGL SERPL-MCNC: 76 MG/DL
TSH SERPL DL<=0.005 MIU/L-ACNC: 2.24 UIU/ML (ref 0.3–4.2)
VIT B12 SERPL-MCNC: 562 PG/ML (ref 232–1245)
VIT D+METAB SERPL-MCNC: 49 NG/ML (ref 20–50)
WBC # BLD AUTO: 7.1 10E3/UL (ref 4–11)

## 2023-12-06 PROCEDURE — 80048 BASIC METABOLIC PNL TOTAL CA: CPT

## 2023-12-06 PROCEDURE — 83540 ASSAY OF IRON: CPT

## 2023-12-06 PROCEDURE — 82728 ASSAY OF FERRITIN: CPT

## 2023-12-06 PROCEDURE — 36415 COLL VENOUS BLD VENIPUNCTURE: CPT

## 2023-12-06 PROCEDURE — 82607 VITAMIN B-12: CPT

## 2023-12-06 PROCEDURE — 80061 LIPID PANEL: CPT

## 2023-12-06 PROCEDURE — 85027 COMPLETE CBC AUTOMATED: CPT

## 2023-12-06 PROCEDURE — 82306 VITAMIN D 25 HYDROXY: CPT

## 2023-12-06 PROCEDURE — 83550 IRON BINDING TEST: CPT

## 2023-12-06 PROCEDURE — 84443 ASSAY THYROID STIM HORMONE: CPT

## 2023-12-08 RX ORDER — RIZATRIPTAN BENZOATE 10 MG/1
10 TABLET ORAL
Qty: 20 TABLET | Refills: 1 | Status: SHIPPED | OUTPATIENT
Start: 2023-12-08

## 2023-12-08 NOTE — ADDENDUM NOTE
Addended by: LITZY ESTRADA on: 12/8/2023 08:36 AM     Modules accepted: Orders     Requested medication(s) are due for refill today: Yes  Patient has already received a courtesy refill: No  Other reason request has been forwarded to provider:   K is 5.3 or below and within 180 days    eGFR is 60 or above and within 180 days

## 2023-12-11 ENCOUNTER — TELEPHONE (OUTPATIENT)
Dept: INTERNAL MEDICINE | Facility: CLINIC | Age: 33
End: 2023-12-11
Payer: COMMERCIAL

## 2023-12-13 NOTE — TELEPHONE ENCOUNTER
Prior Authorization Approval    Medication: ZEPBOUND 2.5 MG/0.5ML SC SOAJ  Authorization Effective Date: 11/13/2023  Authorization Expiration Date: 6/10/2024  Approved Dose/Quantity: 2/28  Reference #: [id number]   Insurance Company: [employer] Non-Specialty PA's - Phone [phone] Fax [phone]  Expected CoPay: $    CoPay Card Available:      Financial Assistance Needed:   Which Pharmacy is filling the prescription: [facility] - [place], [place] - [address] [place] [address]  Pharmacy Notified: Yes  Patient Notified: Yes

## 2023-12-13 NOTE — TELEPHONE ENCOUNTER
PA Initiation    Medication: ZEPBOUND 2.5 MG/0.5ML SC SOAJ  Insurance Company: [employer] Non-Specialty PA's - Phone [phone] Fax [phone]  Pharmacy Filling the Rx: [facility] - [place], [place] - [address] [place] [address]  Filling Pharmacy Phone: [phone]  Filling Pharmacy Fax:    Start Date: 12/13/2023

## 2023-12-28 ENCOUNTER — MYC MEDICAL ADVICE (OUTPATIENT)
Dept: INTERNAL MEDICINE | Facility: CLINIC | Age: 33
End: 2023-12-28
Payer: COMMERCIAL

## 2023-12-28 DIAGNOSIS — E66.811 CLASS 1 OBESITY DUE TO EXCESS CALORIES WITHOUT SERIOUS COMORBIDITY WITH BODY MASS INDEX (BMI) OF 33.0 TO 33.9 IN ADULT: Primary | ICD-10-CM

## 2023-12-28 DIAGNOSIS — E66.09 CLASS 1 OBESITY DUE TO EXCESS CALORIES WITHOUT SERIOUS COMORBIDITY WITH BODY MASS INDEX (BMI) OF 33.0 TO 33.9 IN ADULT: Primary | ICD-10-CM

## 2024-02-01 NOTE — PROGRESS NOTES
Gail is here today for   Chief Complaint   Patient presents with    Office Visit    Establish Care    Sinus Problem     Pain behind right eye, sinus congestion since Monday   .        Medication Refills needed today?  NO,   if you receive a prescription today would you like it to be sent to [facility]? NO    Medications: medications verified and updated    Patient would like communication of their results via:    Home Phone: [phone] (home)  Okay to leave a message containing results? Yes    Tobacco history: verified       Health Maintenance Summary       Hepatitis C Screening (Once)  Overdue - never done    DTaP/Tdap/Td Vaccine (3 - Td or Tdap)  Overdue since 10/11/2022    Medicare Advantage- Medicare Wellness Visit (Yearly - January to December)  Due since 1/1/2024    Depression Screening (Yearly)  Next due on 11/2/2024    Breast Cancer Screening (Every 2 Years)  Next due on 1/15/2026    Colorectal Cancer Risk - Colonoscopy (Every 5 Years)  Next due on 7/26/2027    Hepatitis B Vaccine (For Physician/APC Discussion)   Completed    Osteoporosis Screening   Completed    Shingles Vaccine   Completed    Pneumococcal Vaccine 65+   Completed    Influenza Vaccine   Completed    COVID-19 Vaccine   Completed    Meningococcal Vaccine   Aged Out    HPV Vaccine   Aged Out    Colorectal Cancer Screen-   Discontinued            Patient is due for topics as listed above but is not proceeding with Immunization(s) Dtap/Tdap/Td and MWV (Medicare Wellness Visit) at this time.  .    COVID-19 Vaccine Interest Assessment:   Patient reported already received outside of [facility]  If the patient reports an outside immunization, please update the WIR/ICARE information in the Immunizations activity          Progress Notes by Bonny Stevens CMA at 11/25/2019  9:40 AM     Author: Bonny Stevens CMA Service: Addiction Care Author Type: Certified Medical Assistant    Filed: 11/25/2019  3:54 PM Encounter Date: 11/25/2019 Status: Addendum    : Bonny Stevens CMA (Certified Medical Assistant)    Related Notes: Original Note by Bonny Stevens CMA (Certified Medical Assistant) filed at 11/25/2019  2:23 PM       Patient here today for follow up of medication management. States depression 2/5 denies SI/HI, anxiety 3/5.   PHQ-9  SHERLY-9        Correct pharmacy verified with patient and confirmed in snapshot? [x] yes []no    Charge captured ? [x] yes  [] no    Medications Phoned  to Pharmacy [] yes [x]no  Name of Pharmacist:  List Medications, including dose, quantity and instructions      Medication Prescriptions given to patient   [] yes  [x] no   List the name of the drug the prescription was written for.       Medications ordered this visit were e-scribed.  Verified by order class [] yes  [x] no    Medication changes or discontinuations were communicated to patient's pharmacy: [] yes  [x] no    UA collected [] yes  [x] no    [place] Prescription Monitoring Program Reviewed? [x] yes  [] no    Referrals were made to:none     Future appointment was made: [x] yes  [] no    Dictation completed at time of chart check: [x] yes  [] no    I have checked the documentation for todays encounters and the above information has been reviewed and completed.

## 2024-02-15 ENCOUNTER — TRANSFERRED RECORDS (OUTPATIENT)
Dept: HEALTH INFORMATION MANAGEMENT | Facility: CLINIC | Age: 34
End: 2024-02-15
Payer: COMMERCIAL

## 2024-02-27 ENCOUNTER — TRANSFERRED RECORDS (OUTPATIENT)
Dept: HEALTH INFORMATION MANAGEMENT | Facility: CLINIC | Age: 34
End: 2024-02-27
Payer: COMMERCIAL

## 2024-03-15 ENCOUNTER — TRANSFERRED RECORDS (OUTPATIENT)
Dept: HEALTH INFORMATION MANAGEMENT | Facility: CLINIC | Age: 34
End: 2024-03-15
Payer: COMMERCIAL

## 2024-03-19 ENCOUNTER — TELEPHONE (OUTPATIENT)
Dept: INTERNAL MEDICINE | Facility: CLINIC | Age: 34
End: 2024-03-19
Payer: COMMERCIAL

## 2024-03-19 NOTE — LETTER
24      Janny Kitchen  : 1990          To Whom it May Concern,     Janny Kitchen has been my patient since 2018. In this time, she has made diligent efforts toward weight loss utilizing lifestyle measures as well as pharmacologic agents to achieve a healthy weight. She has a weight related diagnosis of hyperlipidemia. Previous medications tried include bupropion and naltrexone and phentermine, There is a strong family history of obesity and weight related diagnoses in her family which has kept her focused on this aim. In , she was able to start tirzepatide for morbid obesity and tolerated the medication well and was also seeing improvement in her weight.     Please re-consider coverage of the Zepbound medication for this patient with weight related co-morbidities who has already tried and failed alternative agents for management of obesity.       Sincerely,      Lisa Arredondo, DNP, APRN, CNP   24

## 2024-03-19 NOTE — TELEPHONE ENCOUNTER
Called pt to notify her that PA was initiated, and there is a wait for this process      Pt was thankful for the call.       Akil Brunner Jr., CMA on 3/19/2024 at 12:54 PM

## 2024-03-19 NOTE — TELEPHONE ENCOUNTER
Patient calling to state that she needs prior Auth for:    tirzepatide-Weight Management (ZEPBOUND) 5 MG/0.5 ML prefilled pen       Patient stated she is on new insurance with [employer] and they have faxed over 2 requests     Writer confirmed the correct fax    Patient is hoping PCP can call as she was given a provider phone number to call to start prior Auth     Patient has moved up to 5 mg looking to get a higher dose as scheduled     [employer]  Phone: [phone]

## 2024-03-22 ENCOUNTER — LAB (OUTPATIENT)
Dept: LAB | Facility: CLINIC | Age: 34
End: 2024-03-22
Payer: COMMERCIAL

## 2024-03-22 DIAGNOSIS — G56.00 CARPAL TUNNEL SYNDROME: ICD-10-CM

## 2024-03-22 DIAGNOSIS — S52.509A DISTAL RADIAL FRACTURE: Primary | ICD-10-CM

## 2024-03-22 PROCEDURE — 83735 ASSAY OF MAGNESIUM: CPT

## 2024-03-22 PROCEDURE — 82306 VITAMIN D 25 HYDROXY: CPT

## 2024-03-22 PROCEDURE — 84100 ASSAY OF PHOSPHORUS: CPT

## 2024-03-22 PROCEDURE — 83970 ASSAY OF PARATHORMONE: CPT

## 2024-03-22 PROCEDURE — 36415 COLL VENOUS BLD VENIPUNCTURE: CPT

## 2024-03-22 PROCEDURE — 80053 COMPREHEN METABOLIC PANEL: CPT

## 2024-03-23 LAB
ALBUMIN SERPL BCG-MCNC: 4.5 G/DL (ref 3.5–5.2)
ALP SERPL-CCNC: 62 U/L (ref 40–150)
ALT SERPL W P-5'-P-CCNC: 15 U/L (ref 0–50)
ANION GAP SERPL CALCULATED.3IONS-SCNC: 11 MMOL/L (ref 7–15)
AST SERPL W P-5'-P-CCNC: 18 U/L (ref 0–45)
BILIRUB SERPL-MCNC: 0.5 MG/DL
BUN SERPL-MCNC: 19.9 MG/DL (ref 6–20)
CALCIUM SERPL-MCNC: 9.5 MG/DL (ref 8.6–10)
CHLORIDE SERPL-SCNC: 102 MMOL/L (ref 98–107)
CREAT SERPL-MCNC: 0.9 MG/DL (ref 0.51–0.95)
DEPRECATED HCO3 PLAS-SCNC: 24 MMOL/L (ref 22–29)
EGFRCR SERPLBLD CKD-EPI 2021: 86 ML/MIN/1.73M2
GLUCOSE SERPL-MCNC: 92 MG/DL (ref 70–99)
MAGNESIUM SERPL-MCNC: 2.2 MG/DL (ref 1.7–2.3)
PHOSPHATE SERPL-MCNC: 3.9 MG/DL (ref 2.5–4.5)
POTASSIUM SERPL-SCNC: 4 MMOL/L (ref 3.4–5.3)
PROT SERPL-MCNC: 7.3 G/DL (ref 6.4–8.3)
PTH-INTACT SERPL-MCNC: 30 PG/ML (ref 15–65)
SODIUM SERPL-SCNC: 137 MMOL/L (ref 135–145)
VIT D+METAB SERPL-MCNC: 45 NG/ML (ref 20–50)

## 2024-03-25 ENCOUNTER — VIRTUAL VISIT (OUTPATIENT)
Dept: INTERNAL MEDICINE | Facility: CLINIC | Age: 34
End: 2024-03-25
Payer: COMMERCIAL

## 2024-03-25 DIAGNOSIS — E78.2 MIXED HYPERLIPIDEMIA: ICD-10-CM

## 2024-03-25 DIAGNOSIS — F33.41 MAJOR DEPRESSIVE DISORDER, RECURRENT, IN PARTIAL REMISSION (H): ICD-10-CM

## 2024-03-25 DIAGNOSIS — E66.811 CLASS 1 OBESITY DUE TO EXCESS CALORIES WITHOUT SERIOUS COMORBIDITY WITH BODY MASS INDEX (BMI) OF 33.0 TO 33.9 IN ADULT: Primary | ICD-10-CM

## 2024-03-25 DIAGNOSIS — E66.09 CLASS 1 OBESITY DUE TO EXCESS CALORIES WITHOUT SERIOUS COMORBIDITY WITH BODY MASS INDEX (BMI) OF 33.0 TO 33.9 IN ADULT: Primary | ICD-10-CM

## 2024-03-25 PROCEDURE — 99213 OFFICE O/P EST LOW 20 MIN: CPT | Mod: 95 | Performed by: NURSE PRACTITIONER

## 2024-03-25 NOTE — PROGRESS NOTES
Janny is a 33 year old who is being evaluated via a billable video visit.      Assessment & Plan   Problem List Items Addressed This Visit       Class 1 obesity due to excess calories without serious comorbidity with body mass index (BMI) of 33.0 to 33.9 in adult - Primary    Relevant Medications    tirzepatide-Weight Management (ZEPBOUND) 7.5 MG/0.5ML prefilled pen    Mixed hyperlipidemia     She has weight related comorbidities of hyperlipidemia.  There is family history of morbid obesity.  She has had great results so far with tirzepatide and is tolerating the medication well.  There is some concern that her insurance may not cover the drug this year and we are currently awaiting a prior authorization.  If the prior authorization is approved, we will plan to increase the dose of tirzepatide to 7.5 mg weekly with the plan to titrate every 4 weeks if well-tolerated, 2-month follow-up.  If the prior authorization is denied, we may need to consider an alternative medication.  Bupropion-naltrexone was ineffective for her.  She has had results with phentermine but regained the weight after stopping the medication.  Orlistat could also be considered, however, reviewed possible side effects with this medication it makes a less desirable option for many people.  She asks about a compounded semaglutide, I checked with colleagues and was directed to a CDC statement about concerns related to compounding this medication.  As such, I do not recommend this route.    Return in about 3 months (around 6/25/2024).        Subjective   Janny is a 33 year old, presenting for the following health issues:  Recheck Medication (Zepbound)        3/25/2024     4:44 PM   Additional Questions   Roomed by Alice     History of Present Illness       Reason for visit:  Check-in on Zepbound    She eats 2-3 servings of fruits and vegetables daily.She consumes 1 sweetened beverage(s) daily.She exercises with enough effort to increase her heart  "rate 10 to 19 minutes per day.  She exercises with enough effort to increase her heart rate 3 or less days per week.   She is taking medications regularly.       Medication Followup of Zepbound  Taking Medication as prescribed: yes  Side Effects:  None  Medication Helping Symptoms:  yes    Jan 1- starting weight 203lb  Today- 190 lb  (6% loss of starting body weight)     She broke her wrist when she was in [place] and had to have surgery for this. She missed about 2 weeks of the Zepbound medication during this time. She has 1 pen left from the 5 mg dose. She is concerned that her insurance may not cover the medication in this plan year.           Objective    Vitals - Patient Reported  Weight (Patient Reported): 89.8 kg (198 lb)  Height (Patient Reported): 170.2 cm (5' 7\")  BMI (Based on Pt Reported Ht/Wt): 31.01    Wt Readings from Last 5 Encounters:   11/20/23 90.1 kg (198 lb 11.2 oz)   08/15/23 89.8 kg (198 lb)   01/26/23 93.3 kg (205 lb 9.6 oz)   08/11/22 88.7 kg (195 lb 8 oz)   12/08/21 88.9 kg (196 lb)       Physical Exam   GENERAL: alert and no distress  EYES: Eyes grossly normal to inspection.  No discharge or erythema, or obvious scleral/conjunctival abnormalities.  RESP: No audible wheeze, cough, or visible cyanosis.            Video-Visit Details    Type of service:  Video Visit   Originating Location (pt. Location): Home  Distant Location (provider location):  On-site  Platform used for Video Visit: Edyta  Signed Electronically by: Lisa Arredondo NP    "

## 2024-03-26 PROBLEM — E78.2 MIXED HYPERLIPIDEMIA: Status: ACTIVE | Noted: 2024-03-26

## 2024-03-28 ENCOUNTER — TRANSFERRED RECORDS (OUTPATIENT)
Dept: HEALTH INFORMATION MANAGEMENT | Facility: CLINIC | Age: 34
End: 2024-03-28
Payer: COMMERCIAL

## 2024-03-29 NOTE — TELEPHONE ENCOUNTER
PA Initiation    Medication: ZEPBOUND 7.5 MG/0.5ML SC SOAJ  Insurance Company: [employer] - Phone [phone] Fax [phone]  Pharmacy Filling the Rx:    Filling Pharmacy Phone:    Filling Pharmacy Fax:    Start Date: 3/29/2024

## 2024-04-01 NOTE — TELEPHONE ENCOUNTER
PRIOR AUTHORIZATION DENIED    Medication: ZEPBOUND 7.5 MG/0.5ML SC SOAJ  Insurance Company: [employer] - Phone [phone] Fax [phone]  Denial Date: 4/1/2024  Denial Reason(s): NO COVERED BY PLAN  Appeal Information:     Patient Notified:NO         Note Text (......Xxx Chief Complaint.): This diagnosis correlates with the Detail Level: Generalized Other (Free Text): brown 5mm macule

## 2024-04-02 NOTE — TELEPHONE ENCOUNTER
PA Initiation    Medication: ZEPBOUND 5 MG/0.5ML SC SOAJ  Insurance Company: [employer] - Phone [phone] Fax [phone]  Pharmacy Filling the Rx: [facility] - [place], [place] - [address]  Filling Pharmacy Phone: [phone]  Filling Pharmacy Fax: [phone]  Start Date: 4/2/2024        Note: Due to record-high volumes, our turn-around time is taking longer than usual . We are currently 10 business days behind in the pools.   We are working diligently to submit all requests in a timely manner and in the order they are received. Please only flag TRUE URGENT requests as high priority to the pool at this time.   If you have questions - please send a note/message in the active PA encounter and send back to the RPPA (Retail Pharmacy Prior Authorization) team [[id number]].    If you have more specific questions about our process please reach out to our supervisor Amberly Joaquin.   Thank you!

## 2024-04-04 NOTE — TELEPHONE ENCOUNTER
PRIOR AUTHORIZATION DENIED    Medication: ZEPBOUND 5 MG/0.5ML SC SOAJ  Insurance Company: [employer] - Phone [phone] Fax [phone]  Denial Date: 4/2/2024  Denial Reason(s):         Appeal Information:         Patient Notified: NO

## 2024-04-12 NOTE — TELEPHONE ENCOUNTER
Medication Appeal Initiation    Medication: ZEPBOUND 7.5 MG/0.5ML SC SOAJ  Appeal Start Date:  4/12/2024  Insurance Company: [employer]IAM [employer] Phone:   Insurance Fax: [phone]  Comments:    SENT LMN, DENIAL LETTER, CN AND MED LIST TO FAX # ABOVE.

## 2024-04-16 ENCOUNTER — MYC MEDICAL ADVICE (OUTPATIENT)
Dept: INTERNAL MEDICINE | Facility: CLINIC | Age: 34
End: 2024-04-16
Payer: COMMERCIAL

## 2024-04-16 DIAGNOSIS — F33.41 MAJOR DEPRESSIVE DISORDER, RECURRENT, IN PARTIAL REMISSION (H): Primary | ICD-10-CM

## 2024-04-16 DIAGNOSIS — F41.1 GAD (GENERALIZED ANXIETY DISORDER): ICD-10-CM

## 2024-04-16 ASSESSMENT — ANXIETY QUESTIONNAIRES
8. IF YOU CHECKED OFF ANY PROBLEMS, HOW DIFFICULT HAVE THESE MADE IT FOR YOU TO DO YOUR WORK, TAKE CARE OF THINGS AT HOME, OR GET ALONG WITH OTHER PEOPLE?: SOMEWHAT DIFFICULT
IF YOU CHECKED OFF ANY PROBLEMS ON THIS QUESTIONNAIRE, HOW DIFFICULT HAVE THESE PROBLEMS MADE IT FOR YOU TO DO YOUR WORK, TAKE CARE OF THINGS AT HOME, OR GET ALONG WITH OTHER PEOPLE: SOMEWHAT DIFFICULT
6. BECOMING EASILY ANNOYED OR IRRITABLE: SEVERAL DAYS
GAD7 TOTAL SCORE: 7
5. BEING SO RESTLESS THAT IT IS HARD TO SIT STILL: NOT AT ALL
4. TROUBLE RELAXING: NOT AT ALL
1. FEELING NERVOUS, ANXIOUS, OR ON EDGE: SEVERAL DAYS
3. WORRYING TOO MUCH ABOUT DIFFERENT THINGS: MORE THAN HALF THE DAYS
GAD7 TOTAL SCORE: 7
7. FEELING AFRAID AS IF SOMETHING AWFUL MIGHT HAPPEN: SEVERAL DAYS
7. FEELING AFRAID AS IF SOMETHING AWFUL MIGHT HAPPEN: SEVERAL DAYS
GAD7 TOTAL SCORE: 7
2. NOT BEING ABLE TO STOP OR CONTROL WORRYING: MORE THAN HALF THE DAYS

## 2024-04-16 ASSESSMENT — PATIENT HEALTH QUESTIONNAIRE - PHQ9
SUM OF ALL RESPONSES TO PHQ QUESTIONS 1-9: 12
SUM OF ALL RESPONSES TO PHQ QUESTIONS 1-9: 12
10. IF YOU CHECKED OFF ANY PROBLEMS, HOW DIFFICULT HAVE THESE PROBLEMS MADE IT FOR YOU TO DO YOUR WORK, TAKE CARE OF THINGS AT HOME, OR GET ALONG WITH OTHER PEOPLE: VERY DIFFICULT

## 2024-04-17 RX ORDER — BUPROPION HYDROCHLORIDE 150 MG/1
150 TABLET ORAL EVERY MORNING
Qty: 30 TABLET | Refills: 1 | Status: SHIPPED | OUTPATIENT
Start: 2024-04-17 | End: 2024-06-04

## 2024-04-23 NOTE — TELEPHONE ENCOUNTER
ALL PHONE NUMBERS LISTED ON THE PA DENIAL LETTER FROM [employer]/[facility]/[employer] WERE BOGUS - CANNOT GET A HUMAN ON THE LINE AND OR NUMBER JUST DOESN'T WORK.    TRIED CALLING [employer] TO SEE IF THEY CAN SEE THE APPEAL BUT SINCE IT IS HANDLED BY THE PLAN THEY DONT' HAVE ACCESS TO SEE ANY UPDATES.     REFAXED APPEAL TO INSURANCE AGAIN TODAY WITH COVER LETTER REQUESTING THE NOTIFY ME VIA PHONE OR FAX WITH AN UPDATE.

## 2024-04-29 NOTE — TELEPHONE ENCOUNTER
"I CALLED THE \"[employer]\" NUMBER THAT I FOUND ONLINE ([phone] -  FOR [place] IN BENEFITS) SHE SAID SHE WOULD REACH OUT TO WILLIAM AND HOPEFULLY THEY WILL CALL ME BACK SINCE I'M UNABLE TO GET A HOLD OF ANYONE THERE.   "

## 2024-04-29 NOTE — TELEPHONE ENCOUNTER
REC'D CALL BACK FROM T - CASE WAS REC'D ON 4/19/2024 AND PENDING DETERMINATION. CAN TAKE UP TO 30 DAYS.     CASE ID: [id number]    REP: FAITH WITH [place] [facility]. PHONE: [phone]

## 2024-05-09 NOTE — TELEPHONE ENCOUNTER
MEDICATION APPEAL DENIED    Medication: ZEPBOUND 7.5 MG/0.5ML SC SOAJ  Insurance Company: [employer]  Denial Date: 5/7/2024  Denial Reason(s): RECEIVED A VOICEMAIL FROM [employer] STAFF [employer] - PHONE: [phone] - CALLED HER BACK TO SEE ABOUT GETTING A COPY OF THE DENIAL LETTER FAXED TO ME.   [facility]  Patient Notified: NO  Central Prior Authorization Team ONLY: Second level appeals will be managed by the clinic staff and provider. Please contact the [facility] Prior Authorization Team if additional information about the denial is needed.    *I WILL POST APPEAL DENIAL LETTER HERE ONCE IT IS RECEIVED

## 2024-05-09 NOTE — TELEPHONE ENCOUNTER
[facility] Prior Authorization Team   Phone: [phone]      Kamini calling in regards  to the appeal denial.     [phone] (callback number)      She will fax over the denial letter Attn [place] SAMUELS to the department.

## 2024-05-10 ENCOUNTER — TRANSFERRED RECORDS (OUTPATIENT)
Dept: HEALTH INFORMATION MANAGEMENT | Facility: CLINIC | Age: 34
End: 2024-05-10
Payer: COMMERCIAL

## 2024-06-03 ASSESSMENT — ANXIETY QUESTIONNAIRES
IF YOU CHECKED OFF ANY PROBLEMS ON THIS QUESTIONNAIRE, HOW DIFFICULT HAVE THESE PROBLEMS MADE IT FOR YOU TO DO YOUR WORK, TAKE CARE OF THINGS AT HOME, OR GET ALONG WITH OTHER PEOPLE: SOMEWHAT DIFFICULT
GAD7 TOTAL SCORE: 5
8. IF YOU CHECKED OFF ANY PROBLEMS, HOW DIFFICULT HAVE THESE MADE IT FOR YOU TO DO YOUR WORK, TAKE CARE OF THINGS AT HOME, OR GET ALONG WITH OTHER PEOPLE?: SOMEWHAT DIFFICULT
3. WORRYING TOO MUCH ABOUT DIFFERENT THINGS: SEVERAL DAYS
1. FEELING NERVOUS, ANXIOUS, OR ON EDGE: SEVERAL DAYS
2. NOT BEING ABLE TO STOP OR CONTROL WORRYING: SEVERAL DAYS
6. BECOMING EASILY ANNOYED OR IRRITABLE: SEVERAL DAYS
GAD7 TOTAL SCORE: 5
7. FEELING AFRAID AS IF SOMETHING AWFUL MIGHT HAPPEN: SEVERAL DAYS
7. FEELING AFRAID AS IF SOMETHING AWFUL MIGHT HAPPEN: SEVERAL DAYS
5. BEING SO RESTLESS THAT IT IS HARD TO SIT STILL: NOT AT ALL
4. TROUBLE RELAXING: NOT AT ALL
GAD7 TOTAL SCORE: 5

## 2024-06-03 ASSESSMENT — PATIENT HEALTH QUESTIONNAIRE - PHQ9
10. IF YOU CHECKED OFF ANY PROBLEMS, HOW DIFFICULT HAVE THESE PROBLEMS MADE IT FOR YOU TO DO YOUR WORK, TAKE CARE OF THINGS AT HOME, OR GET ALONG WITH OTHER PEOPLE: SOMEWHAT DIFFICULT
SUM OF ALL RESPONSES TO PHQ QUESTIONS 1-9: 8
SUM OF ALL RESPONSES TO PHQ QUESTIONS 1-9: 8

## 2024-06-04 ENCOUNTER — OFFICE VISIT (OUTPATIENT)
Dept: INTERNAL MEDICINE | Facility: CLINIC | Age: 34
End: 2024-06-04
Payer: COMMERCIAL

## 2024-06-04 VITALS
RESPIRATION RATE: 18 BRPM | DIASTOLIC BLOOD PRESSURE: 70 MMHG | SYSTOLIC BLOOD PRESSURE: 114 MMHG | WEIGHT: 184.9 LBS | TEMPERATURE: 98.1 F | HEART RATE: 87 BPM | OXYGEN SATURATION: 99 % | HEIGHT: 66 IN | BODY MASS INDEX: 29.72 KG/M2

## 2024-06-04 DIAGNOSIS — F33.41 MAJOR DEPRESSIVE DISORDER, RECURRENT, IN PARTIAL REMISSION (H): ICD-10-CM

## 2024-06-04 DIAGNOSIS — E66.09 CLASS 1 OBESITY DUE TO EXCESS CALORIES WITHOUT SERIOUS COMORBIDITY WITH BODY MASS INDEX (BMI) OF 33.0 TO 33.9 IN ADULT: ICD-10-CM

## 2024-06-04 DIAGNOSIS — E66.811 CLASS 1 OBESITY DUE TO EXCESS CALORIES WITHOUT SERIOUS COMORBIDITY WITH BODY MASS INDEX (BMI) OF 33.0 TO 33.9 IN ADULT: ICD-10-CM

## 2024-06-04 DIAGNOSIS — L70.0 ACNE VULGARIS: ICD-10-CM

## 2024-06-04 DIAGNOSIS — F41.1 GAD (GENERALIZED ANXIETY DISORDER): Primary | ICD-10-CM

## 2024-06-04 DIAGNOSIS — G43.109 MIGRAINE WITH AURA AND WITHOUT STATUS MIGRAINOSUS, NOT INTRACTABLE: ICD-10-CM

## 2024-06-04 DIAGNOSIS — Z91.030 HISTORY OF BEE STING ALLERGY: ICD-10-CM

## 2024-06-04 PROCEDURE — 99214 OFFICE O/P EST MOD 30 MIN: CPT | Performed by: NURSE PRACTITIONER

## 2024-06-04 PROCEDURE — 96127 BRIEF EMOTIONAL/BEHAV ASSMT: CPT | Performed by: NURSE PRACTITIONER

## 2024-06-04 PROCEDURE — G2211 COMPLEX E/M VISIT ADD ON: HCPCS | Performed by: NURSE PRACTITIONER

## 2024-06-04 RX ORDER — BUPROPION HYDROCHLORIDE 300 MG/1
300 TABLET ORAL EVERY MORNING
Qty: 90 TABLET | Refills: 1 | Status: SHIPPED | OUTPATIENT
Start: 2024-06-04

## 2024-06-04 RX ORDER — EPINEPHRINE 0.3 MG/.3ML
0.3 INJECTION SUBCUTANEOUS PRN
Qty: 2 EACH | Refills: 1 | Status: SHIPPED | OUTPATIENT
Start: 2024-06-04

## 2024-06-04 NOTE — ASSESSMENT & PLAN NOTE
PHQ9 score is 8 and she reports improvement in depression and anxiety symptoms. Tolerating the bupropion well. She has had several relapses of depression and anxiety, encouraged a minimum of 12 months taking bupropion  - increase bupropion to 300 mg daily  - encouraged her to re-establish with the psychologist she worked with in the past

## 2024-06-04 NOTE — ASSESSMENT & PLAN NOTE
Anxiety has been doing well, has had more depression symptoms which lead to resumption of bupropion

## 2024-06-04 NOTE — ASSESSMENT & PLAN NOTE
Long standing history of struggles with weight management after various interventions. She is now going through a weight clinic that prescribes a compounded version of tirzepatide and has been losing approximately 1 lb per week

## 2024-06-04 NOTE — PROGRESS NOTES
"  Assessment & Plan   Problem List Items Addressed This Visit       History of bee sting allergy, anaphylaxis    Relevant Medications    EPINEPHrine (ANY BX GENERIC EQUIV) 0.3 MG/0.3ML injection 2-pack    SHERLY (generalized anxiety disorder)  - Primary     Anxiety has been doing well, has had more depression symptoms which lead to resumption of bupropion          Relevant Medications    buPROPion (WELLBUTRIN XL) 300 MG 24 hr tablet    Major depressive disorder, recurrent, in partial remission (H24)     PHQ9 score is 8 and she reports improvement in depression and anxiety symptoms. Tolerating the bupropion well. She has had several relapses of depression and anxiety, encouraged a minimum of 12 months taking bupropion  - increase bupropion to 300 mg daily  - encouraged her to re-establish with the psychologist she worked with in the past            Relevant Medications    buPROPion (WELLBUTRIN XL) 300 MG 24 hr tablet    Migraine with aura and without status migrainosus, not intractable     Currently stable. PRN triptan          Relevant Medications    buPROPion (WELLBUTRIN XL) 300 MG 24 hr tablet    Class 1 obesity due to excess calories without serious comorbidity with body mass index (BMI) of 33.0 to 33.9 in adult     Long standing history of struggles with weight management after various interventions. She is now going through a weight clinic that prescribes a compounded version of tirzepatide and has been losing approximately 1 lb per week         Acne vulgaris     Continue retinol.              Return in about 6 months (around 12/4/2024) for follow up depression/anxiety .         BMI  Estimated body mass index is 29.62 kg/m  as calculated from the following:    Height as of this encounter: 1.683 m (5' 6.25\").    Weight as of this encounter: 83.9 kg (184 lb 14.4 oz).         Maddie Soliman is a 34 year old, presenting for the following health issues:  Follow Up        6/4/2024     6:48 AM   Additional Questions "   Roomed by Darlyn GOMEZ CMA     History of Present Illness       Mental Health Follow-up:  Patient presents to follow-up on Depression & Anxiety.Patient's depression since last visit has been:  Better  The patient is having other symptoms associated with depression.  Patient's anxiety since last visit has been:  Better  The patient is having other symptoms associated with anxiety.  Any significant life events: grief or loss  Patient is not feeling anxious or having panic attacks.  Patient has no concerns about alcohol or drug use.    She eats 2-3 servings of fruits and vegetables daily.She consumes 1 sweetened beverage(s) daily.She exercises with enough effort to increase her heart rate 10 to 19 minutes per day.  She exercises with enough effort to increase her heart rate 3 or less days per week.   She is taking medications regularly.     She noticed pretty rapid improvement in mood when we started the bupropion. She intends to potentially re-establish with her psychologist. She has had more acne lesions on the forehead since starting the medication. No other noticeable side effects.     Weight was reviewed. She is going through a compounding pharmacy and has started on a tirzepatide equivalent. She has been losing about 1 lb per week.     No recent migraine headaches.     Social History     Tobacco Use    Smoking status: Never    Smokeless tobacco: Never   Vaping Use    Vaping status: Never Used   Substance Use Topics    Alcohol use: Not Currently     Alcohol/week: 3.0 standard drinks of alcohol     Comment: Alcoholic Drinks/day: socially     Drug use: No         3/25/2024     4:36 PM 4/16/2024    10:20 AM 6/3/2024     5:05 PM   PHQ   PHQ-9 Total Score 8 12 8   Q9: Thoughts of better off dead/self-harm past 2 weeks Not at all Not at all Not at all         1/26/2023     9:12 AM 4/16/2024    10:21 AM 6/3/2024     5:06 PM   SHERLY-7 SCORE   Total Score 10 (moderate anxiety) 7 (mild anxiety) 5 (mild anxiety)   Total Score  "10 7 5         6/3/2024     5:05 PM   Last PHQ-9   1.  Little interest or pleasure in doing things 1   2.  Feeling down, depressed, or hopeless 1   3.  Trouble falling or staying asleep, or sleeping too much 1   4.  Feeling tired or having little energy 2   5.  Poor appetite or overeating 2   6.  Feeling bad about yourself 1   7.  Trouble concentrating 0   8.  Moving slowly or restless 0   Q9: Thoughts of better off dead/self-harm past 2 weeks 0   PHQ-9 Total Score 8         6/3/2024     5:06 PM   SHERLY-7    1. Feeling nervous, anxious, or on edge 1   2. Not being able to stop or control worrying 1   3. Worrying too much about different things 1   4. Trouble relaxing 0   5. Being so restless that it is hard to sit still 0   6. Becoming easily annoyed or irritable 1   7. Feeling afraid, as if something awful might happen 1   SHERLY-7 Total Score 5   If you checked any problems, how difficult have they made it for you to do your work, take care of things at home, or get along with other people? Somewhat difficult           Objective    /70 (BP Location: Right arm, Patient Position: Sitting, Cuff Size: Adult Regular)   Pulse 87   Temp 98.1  F (36.7  C) (Oral)   Resp 18   Ht 1.683 m (5' 6.25\")   Wt 83.9 kg (184 lb 14.4 oz)   LMP  (LMP Unknown)   SpO2 99%   BMI 29.62 kg/m    Body mass index is 29.62 kg/m .    Wt Readings from Last 5 Encounters:   06/04/24 83.9 kg (184 lb 14.4 oz)   11/20/23 90.1 kg (198 lb 11.2 oz)   08/15/23 89.8 kg (198 lb)   01/26/23 93.3 kg (205 lb 9.6 oz)   08/11/22 88.7 kg (195 lb 8 oz)     Physical Exam   GENERAL: alert and no distress          The longitudinal plan of care for the diagnosis(es)/condition(s) as documented were addressed during this visit. Due to the added complexity in care, I will continue to support Janny in the subsequent management and with ongoing continuity of care.    Signed Electronically by: Lisa Arredondo NP    "

## 2024-06-13 ENCOUNTER — MYC MEDICAL ADVICE (OUTPATIENT)
Dept: INTERNAL MEDICINE | Facility: CLINIC | Age: 34
End: 2024-06-13
Payer: COMMERCIAL

## 2024-06-13 DIAGNOSIS — E66.811 CLASS 1 OBESITY DUE TO EXCESS CALORIES WITHOUT SERIOUS COMORBIDITY WITH BODY MASS INDEX (BMI) OF 33.0 TO 33.9 IN ADULT: Primary | ICD-10-CM

## 2024-06-13 DIAGNOSIS — E66.09 CLASS 1 OBESITY DUE TO EXCESS CALORIES WITHOUT SERIOUS COMORBIDITY WITH BODY MASS INDEX (BMI) OF 33.0 TO 33.9 IN ADULT: Primary | ICD-10-CM

## 2024-06-13 NOTE — TELEPHONE ENCOUNTER
Please print letter for appeal dated 4/11/24 that I wrote and see if pt wants it mailed or to come pick it up

## 2024-08-09 ENCOUNTER — TRANSFERRED RECORDS (OUTPATIENT)
Dept: HEALTH INFORMATION MANAGEMENT | Facility: CLINIC | Age: 34
End: 2024-08-09
Payer: COMMERCIAL

## 2024-10-21 ENCOUNTER — PATIENT OUTREACH (OUTPATIENT)
Dept: CARE COORDINATION | Facility: CLINIC | Age: 34
End: 2024-10-21
Payer: COMMERCIAL

## 2024-11-04 ENCOUNTER — PATIENT OUTREACH (OUTPATIENT)
Dept: CARE COORDINATION | Facility: CLINIC | Age: 34
End: 2024-11-04
Payer: COMMERCIAL

## 2024-11-08 ENCOUNTER — TRANSFERRED RECORDS (OUTPATIENT)
Dept: HEALTH INFORMATION MANAGEMENT | Facility: CLINIC | Age: 34
End: 2024-11-08
Payer: COMMERCIAL

## 2024-12-08 SDOH — HEALTH STABILITY: PHYSICAL HEALTH: ON AVERAGE, HOW MANY MINUTES DO YOU ENGAGE IN EXERCISE AT THIS LEVEL?: 40 MIN

## 2024-12-08 SDOH — HEALTH STABILITY: PHYSICAL HEALTH: ON AVERAGE, HOW MANY DAYS PER WEEK DO YOU ENGAGE IN MODERATE TO STRENUOUS EXERCISE (LIKE A BRISK WALK)?: 2 DAYS

## 2024-12-08 ASSESSMENT — ANXIETY QUESTIONNAIRES
2. NOT BEING ABLE TO STOP OR CONTROL WORRYING: SEVERAL DAYS
5. BEING SO RESTLESS THAT IT IS HARD TO SIT STILL: SEVERAL DAYS
IF YOU CHECKED OFF ANY PROBLEMS ON THIS QUESTIONNAIRE, HOW DIFFICULT HAVE THESE PROBLEMS MADE IT FOR YOU TO DO YOUR WORK, TAKE CARE OF THINGS AT HOME, OR GET ALONG WITH OTHER PEOPLE: SOMEWHAT DIFFICULT
GAD7 TOTAL SCORE: 9
8. IF YOU CHECKED OFF ANY PROBLEMS, HOW DIFFICULT HAVE THESE MADE IT FOR YOU TO DO YOUR WORK, TAKE CARE OF THINGS AT HOME, OR GET ALONG WITH OTHER PEOPLE?: SOMEWHAT DIFFICULT
6. BECOMING EASILY ANNOYED OR IRRITABLE: MORE THAN HALF THE DAYS
GAD7 TOTAL SCORE: 9
7. FEELING AFRAID AS IF SOMETHING AWFUL MIGHT HAPPEN: SEVERAL DAYS
4. TROUBLE RELAXING: SEVERAL DAYS
1. FEELING NERVOUS, ANXIOUS, OR ON EDGE: MORE THAN HALF THE DAYS
3. WORRYING TOO MUCH ABOUT DIFFERENT THINGS: SEVERAL DAYS
GAD7 TOTAL SCORE: 9
7. FEELING AFRAID AS IF SOMETHING AWFUL MIGHT HAPPEN: SEVERAL DAYS

## 2024-12-08 ASSESSMENT — PATIENT HEALTH QUESTIONNAIRE - PHQ9
10. IF YOU CHECKED OFF ANY PROBLEMS, HOW DIFFICULT HAVE THESE PROBLEMS MADE IT FOR YOU TO DO YOUR WORK, TAKE CARE OF THINGS AT HOME, OR GET ALONG WITH OTHER PEOPLE: SOMEWHAT DIFFICULT
SUM OF ALL RESPONSES TO PHQ QUESTIONS 1-9: 10
SUM OF ALL RESPONSES TO PHQ QUESTIONS 1-9: 10

## 2024-12-08 ASSESSMENT — SOCIAL DETERMINANTS OF HEALTH (SDOH): HOW OFTEN DO YOU GET TOGETHER WITH FRIENDS OR RELATIVES?: ONCE A WEEK

## 2024-12-09 ENCOUNTER — OFFICE VISIT (OUTPATIENT)
Dept: INTERNAL MEDICINE | Facility: CLINIC | Age: 34
End: 2024-12-09
Attending: NURSE PRACTITIONER
Payer: COMMERCIAL

## 2024-12-09 VITALS
BODY MASS INDEX: 29.11 KG/M2 | SYSTOLIC BLOOD PRESSURE: 114 MMHG | TEMPERATURE: 99 F | HEIGHT: 66 IN | HEART RATE: 83 BPM | OXYGEN SATURATION: 98 % | WEIGHT: 181.1 LBS | DIASTOLIC BLOOD PRESSURE: 66 MMHG

## 2024-12-09 DIAGNOSIS — F33.41 MAJOR DEPRESSIVE DISORDER, RECURRENT, IN PARTIAL REMISSION (H): ICD-10-CM

## 2024-12-09 DIAGNOSIS — E66.09 CLASS 1 OBESITY DUE TO EXCESS CALORIES WITHOUT SERIOUS COMORBIDITY WITH BODY MASS INDEX (BMI) OF 33.0 TO 33.9 IN ADULT: ICD-10-CM

## 2024-12-09 DIAGNOSIS — E78.2 MIXED HYPERLIPIDEMIA: Primary | ICD-10-CM

## 2024-12-09 DIAGNOSIS — L71.0 PERIORAL DERMATITIS: ICD-10-CM

## 2024-12-09 DIAGNOSIS — E66.811 CLASS 1 OBESITY DUE TO EXCESS CALORIES WITHOUT SERIOUS COMORBIDITY WITH BODY MASS INDEX (BMI) OF 33.0 TO 33.9 IN ADULT: ICD-10-CM

## 2024-12-09 DIAGNOSIS — F41.1 GAD (GENERALIZED ANXIETY DISORDER): ICD-10-CM

## 2024-12-09 DIAGNOSIS — G43.109 MIGRAINE WITH AURA AND WITHOUT STATUS MIGRAINOSUS, NOT INTRACTABLE: ICD-10-CM

## 2024-12-09 DIAGNOSIS — Z91.030 HISTORY OF BEE STING ALLERGY: ICD-10-CM

## 2024-12-09 PROCEDURE — 99395 PREV VISIT EST AGE 18-39: CPT | Mod: 25 | Performed by: NURSE PRACTITIONER

## 2024-12-09 PROCEDURE — 99213 OFFICE O/P EST LOW 20 MIN: CPT | Mod: 25 | Performed by: NURSE PRACTITIONER

## 2024-12-09 PROCEDURE — 91320 SARSCV2 VAC 30MCG TRS-SUC IM: CPT | Performed by: NURSE PRACTITIONER

## 2024-12-09 PROCEDURE — 90656 IIV3 VACC NO PRSV 0.5 ML IM: CPT | Performed by: NURSE PRACTITIONER

## 2024-12-09 PROCEDURE — 90471 IMMUNIZATION ADMIN: CPT | Performed by: NURSE PRACTITIONER

## 2024-12-09 PROCEDURE — 90480 ADMN SARSCOV2 VAC 1/ONLY CMP: CPT | Performed by: NURSE PRACTITIONER

## 2024-12-09 PROCEDURE — 36415 COLL VENOUS BLD VENIPUNCTURE: CPT | Performed by: NURSE PRACTITIONER

## 2024-12-09 PROCEDURE — 80061 LIPID PANEL: CPT | Performed by: NURSE PRACTITIONER

## 2024-12-09 ASSESSMENT — PAIN SCALES - GENERAL: PAINLEVEL_OUTOF10: NO PAIN (0)

## 2024-12-09 NOTE — PROGRESS NOTES
Preventive Care Visit  [facility]  Lisa Arredondo NP    Dec 9, 2024      Assessment & Plan   Problem List Items Addressed This Visit       History of bee sting allergy, anaphylaxis     She should keep an active EpiPen at all times         SHERLY (generalized anxiety disorder)      Continue with bupropion         Major depressive disorder, recurrent, in partial remission (H)     PHQ-9 score is 10, SHERLY-7 is 9.  She has been tolerating the bupropion well.  Notes that her moods been doing okay overall  -Continue with bupropion 300 mg daily         Migraine with aura and without status migrainosus, not intractable     Currently doing well.  She takes rizatriptan as needed         Class 1 obesity due to excess calories without serious comorbidity with body mass index (BMI) of 33.0 to 33.9 in adult     Longstanding struggle with weight management after various interventions.  She has been going through a weight loss clinic for compounded tirzepatide and has lost approximately 20 pounds.  Now feels that she is in a better place and has been better able to gauge portion sizes.  We discussed the pros and cons of continuing on with the medication, I would be willing to prescribe this through [facility].  - For the time being we are going to proceed without medication.  She is going to work on finding a more regular exercise routine.  Perhaps consider joining a gym that offers a regularly scheduled class or seeing a   -If she starts to struggle with weight gain, we could consider restarting compounded tirzepatide 5 mg weekly         Mixed hyperlipidemia - Primary     Repeat lipid profile now that she is lost some weight to get a new baseline         Relevant Orders    Lipid panel reflex to direct LDL Non-fasting    Perioral dermatitis     Looks like she has some perioral dermatitis.  Has been using clindamycin without seeing any improvement  -Discontinue clindamycin gel and start  "metronidazole 1% daily  -Follow-up in 8 weeks if there is no improvement, we may need to consider oral doxycycline         Relevant Medications    metroNIDAZOLE (NORITATE) 1 % external cream      -COVID and influenza vaccine given today       BMI  Estimated body mass index is 29.23 kg/m  as calculated from the following:    Height as of this encounter: 1.676 m (5' 6\").    Weight as of this encounter: 82.1 kg (181 lb 1.6 oz).       Counseling  Appropriate preventive services were addressed with this patient via screening, questionnaire, or discussion as appropriate for fall prevention, nutrition, physical activity, Tobacco-use cessation, social engagement, weight loss and cognition.  Checklist reviewing preventive services available has been given to the patient.  Reviewed patient's diet, addressing concerns and/or questions.   She is at risk for lack of exercise and has been provided with information to increase physical activity for the benefit of her well-being.   She is at risk for psychosocial distress and has been provided with information to reduce risk.   The patient's PHQ-9 score is consistent with moderate depression. She was provided with information regarding depression.       Return in about 1 year (around 12/9/2025) for physical .        Maddie Soliman is a 34 year old, presenting for the following:  Physical, Recheck Medication, and Imm/Inj (Covid and Flu shot)        12/9/2024     3:45 PM   Additional Questions   Roomed by Juan Mclain Paw          Imm/Inj      Treated with Bactrim for Impetigo at the end of October. She has a rash on the face, has been using a topical clindamycin gel without seeing any improvement. She is also using a sulfa-wash. She still has spots around the mouth and around the eyes.     She was using tirzepatide compounded, previously taking only 5 mg weekly because she felt more down while taking the 7.5 mg weekly. She has a better handle on portion sizes since she went on the " medication and realized it. She last took a dose about 3 weeks ago and before then she was taking it every other week. She finds that her stomach feels less tender/bloated when she is taking the medication.     Answers submitted by the patient for this visit:  Patient Health Questionnaire (Submitted on 12/8/2024)  If you checked off any problems, how difficult have these problems made it for you to do your work, take care of things at home, or get along with other people?: Somewhat difficult  PHQ9 TOTAL SCORE: 10  Patient Health Questionnaire (G7) (Submitted on 12/8/2024)  SHERLY 7 TOTAL SCORE: 9    Health Care Directive  Patient does not have a Health Care Directive: Discussed advance care planning with patient; however, patient declined at this time.        12/8/2024   General Health   How would you rate your overall physical health? Good   Feel stress (tense, anxious, or unable to sleep) To some extent      (!) STRESS CONCERN      12/8/2024   Nutrition   Three or more servings of calcium each day? Yes   Diet: Regular (no restrictions)   How many servings of fruit and vegetables per day? (!) 2-3   How many sweetened beverages each day? 0-1            12/8/2024   Exercise   Days per week of moderate/strenous exercise 2 days   Average minutes spent exercising at this level 40 min      (!) EXERCISE CONCERN      12/8/2024   Social Factors   Frequency of gathering with friends or relatives Once a week   Worry food won't last until get money to buy more No   Food not last or not have enough money for food? No   Do you have housing? (Housing is defined as stable permanent housing and does not include staying ouside in a car, in a tent, in an abandoned building, in an overnight shelter, or couch-surfing.) Yes   Are you worried about losing your housing? No   Lack of transportation? No   Unable to get utilities (heat,electricity)? No            12/8/2024   Dental   Dentist two times every year? Yes            12/8/2024   TB  Screening   Were you born outside of the [place]? No        Today's PHQ-9 Score:       12/8/2024     7:05 PM   PHQ-9 SCORE   PHQ-9 Total Score MyChart 10 (Moderate depression)   PHQ-9 Total Score 10        Patient-reported         12/8/2024   Substance Use   Alcohol more than 3/day or more than 7/wk Not Applicable   Do you use any other substances recreationally? (!) CANNABIS PRODUCTS        Social History     Tobacco Use    Smoking status: Never    Smokeless tobacco: Never   Vaping Use    Vaping status: Never Used   Substance Use Topics    Alcohol use: Not Currently     Alcohol/week: 3.0 standard drinks of alcohol     Comment: Alcoholic Drinks/day: socially     Drug use: No           11/20/2023   LAST FHS-7 RESULTS   1st degree relative breast or ovarian cancer No    Any relative bilateral breast cancer No    Any male have breast cancer No    Any ONE woman have BOTH breast AND ovarian cancer No    Any woman with breast cancer before 50yrs No    2 or more relatives with breast AND/OR ovarian cancer No    2 or more relatives with breast AND/OR bowel cancer Unknown        Patient-reported           12/8/2024   STI Screening   New sexual partner(s) since last STI/HIV test? No            Latest Ref Rng & Units 8/11/2022    11:21 AM 5/13/2021     2:19 PM 3/5/2019    12:00 AM   PAP / HPV   PAP  Negative for Intraepithelial Lesion or Malignancy (NILM)  Negative for squamous intraepithelial lesion or malignancy  Electronically signed by Moraima Fonseca CT (ASCP) on 5/19/2021 at  2:59 PM       HPV 16 DNA Negative Negative  Negative     HPV 18 DNA Negative Negative  Negative     HPV_EXT - HISTORICAL    See Scanned Report    Other HR HPV Negative Negative  Negative             12/8/2024   Contraception/Family Planning   Questions about contraception or family planning No        Reviewed and updated as needed this visit by Provider   Tobacco  Allergies  Meds  Problems  Med Hx  Surg Hx  Fam Hx                   Objective   "  Exam  /66   Pulse 83   Temp 99  F (37.2  C) (Oral)   Ht 1.676 m (5' 6\")   Wt 82.1 kg (181 lb 1.6 oz)   LMP  (LMP Unknown)   SpO2 98%   BMI 29.23 kg/m     Estimated body mass index is 29.23 kg/m  as calculated from the following:    Height as of this encounter: 1.676 m (5' 6\").    Weight as of this encounter: 82.1 kg (181 lb 1.6 oz).    Wt Readings from Last 5 Encounters:   12/09/24 82.1 kg (181 lb 1.6 oz)   06/04/24 83.9 kg (184 lb 14.4 oz)   11/20/23 90.1 kg (198 lb 11.2 oz)   08/15/23 89.8 kg (198 lb)   01/26/23 93.3 kg (205 lb 9.6 oz)       Physical Exam  GENERAL: alert and no distress  EYES: Eyes grossly normal to inspection, conjunctivae and sclerae normal  HENT: ear canals and TM's normal, mouth without ulcers or lesions  NECK: no adenopathy, no asymmetry, masses, or scars  RESP: lungs clear to auscultation - no rales, rhonchi or wheezes  CV: regular rate and rhythm, normal S1 S2, no S3 or S4, no murmur, click or rub, no peripheral edema  MS: no gross musculoskeletal defects noted, no edema  SKIN: Perioral mildly erythematous papular lesions  NEURO: Normal strength and tone, mentation intact and speech normal  PSYCH: mentation appears normal, affect normal/bright        Signed Electronically by: Lisa Arredondo NP    "

## 2024-12-10 ENCOUNTER — TELEPHONE (OUTPATIENT)
Dept: INTERNAL MEDICINE | Facility: CLINIC | Age: 34
End: 2024-12-10
Payer: COMMERCIAL

## 2024-12-10 DIAGNOSIS — L71.0 PERIORAL DERMATITIS: Primary | ICD-10-CM

## 2024-12-10 LAB
CHOLEST SERPL-MCNC: 163 MG/DL
FASTING STATUS PATIENT QL REPORTED: NORMAL
HDLC SERPL-MCNC: 55 MG/DL
LDLC SERPL CALC-MCNC: 94 MG/DL
NONHDLC SERPL-MCNC: 108 MG/DL
TRIGL SERPL-MCNC: 72 MG/DL

## 2024-12-10 RX ORDER — METRONIDAZOLE 7.5 MG/G
GEL TOPICAL
Qty: 45 G | Refills: 5 | Status: SHIPPED | OUTPATIENT
Start: 2024-12-10

## 2024-12-10 NOTE — ASSESSMENT & PLAN NOTE
PHQ-9 score is 10, SHERLY-7 is 9.  She has been tolerating the bupropion well.  Notes that her moods been doing okay overall  -Continue with bupropion 300 mg daily

## 2024-12-10 NOTE — TELEPHONE ENCOUNTER
[facility] Alternative Request or Prior Auth    metroNIDAZOLE (NORITATE) 1 % external cream       Message: Insurance prefers other generic Please consider changing RX medication change request or submit Prior Authorization

## 2024-12-10 NOTE — ASSESSMENT & PLAN NOTE
Looks like she has some perioral dermatitis.  Has been using clindamycin without seeing any improvement  -Discontinue clindamycin gel and start metronidazole 1% daily  -Follow-up in 8 weeks if there is no improvement, we may need to consider oral doxycycline

## 2024-12-10 NOTE — ASSESSMENT & PLAN NOTE
Longstanding struggle with weight management after various interventions.  She has been going through a weight loss clinic for compounded tirzepatide and has lost approximately 20 pounds.  Now feels that she is in a better place and has been better able to gauge portion sizes.  We discussed the pros and cons of continuing on with the medication, I would be willing to prescribe this through [facility] compound[facility].  - For the time being we are going to proceed without medication.  She is going to work on finding a more regular exercise routine.  Perhaps consider joining a gym that offers a regularly scheduled class or seeing a   -If she starts to struggle with weight gain, we could consider restarting compounded tirzepatide 5 mg weekly

## 2025-01-28 ENCOUNTER — MYC MEDICAL ADVICE (OUTPATIENT)
Dept: INTERNAL MEDICINE | Facility: CLINIC | Age: 35
End: 2025-01-28
Payer: COMMERCIAL

## 2025-01-28 DIAGNOSIS — L71.0 PERIORAL DERMATITIS: ICD-10-CM

## 2025-02-04 RX ORDER — METRONIDAZOLE 7.5 MG/G
GEL TOPICAL
Qty: 45 G | Refills: 5 | Status: SHIPPED | OUTPATIENT
Start: 2025-02-04

## 2025-02-26 ENCOUNTER — MYC MEDICAL ADVICE (OUTPATIENT)
Dept: INTERNAL MEDICINE | Facility: CLINIC | Age: 35
End: 2025-02-26
Payer: COMMERCIAL

## 2025-02-26 DIAGNOSIS — L71.0 PERIORAL DERMATITIS: Primary | ICD-10-CM

## 2025-03-03 RX ORDER — TETRACYCLINE HYDROCHLORIDE 250 MG/1
CAPSULE ORAL
Qty: 132 CAPSULE | Refills: 0 | Status: SHIPPED | OUTPATIENT
Start: 2025-03-03 | End: 2025-04-28

## 2025-04-27 ENCOUNTER — MYC MEDICAL ADVICE (OUTPATIENT)
Dept: INTERNAL MEDICINE | Facility: CLINIC | Age: 35
End: 2025-04-27
Payer: COMMERCIAL

## 2025-05-20 ENCOUNTER — VIRTUAL VISIT (OUTPATIENT)
Dept: INTERNAL MEDICINE | Facility: CLINIC | Age: 35
End: 2025-05-20
Payer: COMMERCIAL

## 2025-05-20 DIAGNOSIS — F41.1 GAD (GENERALIZED ANXIETY DISORDER): ICD-10-CM

## 2025-05-20 DIAGNOSIS — E66.811 CLASS 1 OBESITY DUE TO EXCESS CALORIES WITHOUT SERIOUS COMORBIDITY WITH BODY MASS INDEX (BMI) OF 33.0 TO 33.9 IN ADULT: ICD-10-CM

## 2025-05-20 DIAGNOSIS — L71.0 PERIORAL DERMATITIS: Primary | ICD-10-CM

## 2025-05-20 DIAGNOSIS — E66.09 CLASS 1 OBESITY DUE TO EXCESS CALORIES WITHOUT SERIOUS COMORBIDITY WITH BODY MASS INDEX (BMI) OF 33.0 TO 33.9 IN ADULT: ICD-10-CM

## 2025-05-20 PROCEDURE — 98006 SYNCH AUDIO-VIDEO EST MOD 30: CPT | Performed by: NURSE PRACTITIONER

## 2025-05-20 NOTE — PROGRESS NOTES
Internal Medicine Video Visit  [address]  [place] [address]  Phone: [phone]  Fax: [phone]      Patient Name: Janny Kitchen  Patient Age: 35 year old  YOB: 1990  MRN: [id number]    Date of Video Visit: 5/20/2025  Primary Provider: Lisa Arredondo    Subjective   Patient presents with:  Medication Request: Patient would like to discuss getting back on zepbound.          5/20/2025    11:54 AM   Additional Questions   Roomed by  Rayne Weiss     HPI:  Janny Kitchen, 35-year-old female    - Weight gain of 20 pounds, feeling frustrated with lack of progress in weight management.  - Consistently achieving 10,000 steps a day, five days a week, but struggling with maintaining consistent nutrition.  - Recently started an antibiotic regimen for perioral dermatitis, initiated 2 weeks ago.  - Experiencing challenges with the timing of antibiotic doses due to dietary restrictions.  - Previously used GLP-1 medication but discontinued due to cost concerns.  - Considering insurance options for potential coverage of weight management medication.  - PHQ-9 score decreased from 10 to 8, indicating stable depression symptoms.  - Anxiety noted as a significant concern, attributed to environmental factors.        Patient Active Problem List   Diagnosis    History of bee sting allergy, anaphylaxis    SHERLY (generalized anxiety disorder)     Major depressive disorder, recurrent, in partial remission    Migraine with aura and without status migrainosus, not intractable    Class 1 obesity due to excess calories without serious comorbidity with body mass index (BMI) of 33.0 to 33.9 in adult    ASCUS with positive high risk HPV cervical    Family history of thyroid disease, mother    Acne vulgaris    Osteochondral lesion of talar dome    Mixed hyperlipidemia    Perioral dermatitis     Current Scheduled Meds:  Current Outpatient Medications   Medication Sig  Dispense Refill    buPROPion (WELLBUTRIN XL) 300 MG 24 hr tablet TAKE 1 TABLET(300 MG) BY MOUTH EVERY MORNING 90 tablet 1    EPINEPHrine (ANY BX GENERIC EQUIV) 0.3 MG/0.3ML injection 2-pack Inject 0.3 mLs (0.3 mg) into the muscle as needed for anaphylaxis 2 each 1    levonorgestrel (MIRENA) 20 MCG/24HR IUD 1 each by Intrauterine route once Placed 3/2021      magnesium gluconate (MAGONATE) 500 (27 Mg) MG tablet Take 500 mg by mouth daily.      metroNIDAZOLE (METROGEL) 0.75 % external gel Apply thin layer to face once daily 45 g 5    rizatriptan (MAXALT) 10 MG tablet Take 1 tablet (10 mg) by mouth at onset of headache for migraine May repeat in 2 hours. Max 3 tablets/24 hours. 20 tablet 1    Sulfacetamide Sodium-Sulfur 10-5 % LIQD WASH TO AFFECTED AREA ON FACE ONCE DAILY. LATHER AND LET SIT 1 TO 2 MINUTES PRIOR TO RINSING.      tirzepatide-Weight Management (ZEPBOUND) 2.5 MG/0.5ML prefilled pen Inject 0.5 mLs (2.5 mg) subcutaneously every 7 days. 2 mL 0    VITAMIN D, CHOLECALCIFEROL, PO Take 5,000 Units by mouth daily.           Objective   Physical Examination:  Wt Readings from Last 5 Encounters:   12/09/24 82.1 kg (181 lb 1.6 oz)   06/04/24 83.9 kg (184 lb 14.4 oz)   11/20/23 90.1 kg (198 lb 11.2 oz)   08/15/23 89.8 kg (198 lb)   01/26/23 93.3 kg (205 lb 9.6 oz)     There is no height or weight on file to calculate BMI.     GENERAL: alert and no distress  EYES: Eyes grossly normal to inspection.  No discharge or erythema, or obvious scleral/conjunctival abnormalities.  RESP: No audible wheeze, cough, or visible cyanosis.    SKIN: Visible skin clear. No significant rash, abnormal pigmentation or lesions.  NEURO: Cranial nerves grossly intact.  Mentation and speech appropriate for age.  PSYCH: Appropriate affect, tone, and pace of words    Diagnoses managed today:  1. Perioral dermatitis    2. Class 1 obesity due to excess calories without serious comorbidity with body mass index (BMI) of 33.0 to 33.9 in adult    3.  SHERLY (generalized anxiety disorder)          Assessment & Plan     Perioral dermatitis:  - Recently started antibiotic treatment. Too early to determine effectiveness.  - Continue antibiotic treatment. Consider dermatology referral if no improvement.    Class 1 obesity due to excess calories without serious comorbidity with BMI of 33.0 to 33.9 in adult:  - Difficulty maintaining weight loss due to dietary habits and cost of medication.  - Consider Zepbound, starting at 2.5 mg, pending insurance coverage verification. Maintain current lifestyle modifications, including portion control and daily step goals.   - Alternative option discussed today was compounded oral semaglutide   - Follow up 4-6 weeks after initiating one of these medications.     SHERLY (generalized anxiety disorder):  - Anxiety attributed to environmental factors; depression symptoms stable.  - Continue current management. Reassess if anxiety becomes more prominent.    Depression:  - stable with bupropion       I am having Janny Kitchen start on tirzepatide-Weight Management. I am also having her maintain her levonorgestrel, Sulfacetamide Sodium-Sulfur, rizatriptan, EPINEPHrine, magnesium gluconate, (VITAMIN D, CHOLECALCIFEROL, PO), metroNIDAZOLE, and buPROPion.        No orders of the defined types were placed in this encounter.    Video-Visit Details   Type of service:  Video Visit   Video Start Time: 12:04 PM  Video End Time:12:24 PM  Originating Location (pt. Location): Home  Distant Location (provider location):  On-site  Platform used for Video Visit: Edyta  Signed Electronically by: Lisa Arredondo NP  Follow up: Return in about 6 months (around 11/20/2025). Sooner if needed.    The longitudinal plan of care for the diagnosis(es)/condition(s) as documented were addressed during this visit. Due to the added complexity in care, I will continue to support Janny in the subsequent management and with ongoing continuity of care.    Lisa Arredondo DNP,  APRN, CNP   Electronically signed

## 2025-05-20 NOTE — PROGRESS NOTES
"Janny is a 35 year old who is being evaluated via a billable video visit.    How would you like to obtain your AVS? MyChart  If the video visit is dropped, the invitation should be resent by: Text to cell phone: [phone]  Will anyone else be joining your video visit? No  {If patient encounters technical issues they should call [phone] :982030}    {PROVIDER CHARTING PREFERENCE:870993}    Subjective   Janny is a 35 year old, presenting for the following health issues:  Medication Request (Patient would like to discuss getting back on zepbound. )  {(!) Visit Details have not yet been documented.  Please enter Visit Details and then use this list to pull in documentation. (Optional):192712}  History of Present Illness       Reason for visit:  Medication She is missing 1 dose(s) of medications per week.  She is not taking prescribed medications regularly due to remembering to take.        {SUPERLIST (Optional):023976}  {additonal problems for provider to add (Optional):315571}    {ROS Picklists (Optional):200720}      Objective    Vitals - Patient Reported  Weight (Patient Reported): 89.4 kg (197 lb)  Height (Patient Reported): 170.2 cm (5' 7\")  BMI (Based on Pt Reported Ht/Wt): 30.85  Pain Score: No Pain (0)        Physical Exam   {video visit exam brief selected:839631}    {Diagnostic Test Results (Optional):924629}      Video-Visit Details    Type of service:  Video Visit   Originating Location (pt. Location): Other Work  {PROVIDER LOCATION On-site should be selected for visits conducted from your clinic location or adjoining [facility] hospital, academic office, or other nearby [facility] building. Off-site should be selected for all other provider locations, including home:982539}  Distant Location (provider location):  On-site  Platform used for Video Visit: {Virtual Visit Platforms:007119::\"[employer]\"}  Signed Electronically by: Lisa Arredondo NP  {Email feedback regarding this note to " [email or web address]   :[id number]}

## 2025-05-22 ENCOUNTER — TELEPHONE (OUTPATIENT)
Dept: INTERNAL MEDICINE | Facility: CLINIC | Age: 35
End: 2025-05-22
Payer: COMMERCIAL

## 2025-05-27 NOTE — TELEPHONE ENCOUNTER
PA Initiation    Medication: TIRZEPATIDE-WEIGHT MANAGEMENT 2.5 MG/0.5ML SC SOAJ  Insurance Company: [employer] - Phone [phone] Fax [phone]  Pharmacy Filling the Rx: [id number] - [place], [place] - [facility] [address] AT [facility] OF [address]  Filling Pharmacy Phone: [phone]  Filling Pharmacy Fax: [phone]  Start Date: 5/27/2025

## 2025-05-27 NOTE — TELEPHONE ENCOUNTER
PRIOR AUTHORIZATION DENIED    Medication: TIRZEPATIDE-WEIGHT MANAGEMENT 2.5 MG/0.5ML SC SOAJ  Insurance Company: [employer] - Phone [phone] Fax [phone]  Denial Date: 5/27/2025  Denial Reason(s): Excluded from coverage - review not available    Appeal Information: N/A due to excluded status  Patient Notified: No, care team must notify patient so an informed decision can be made. This is out of the scope of practice for the PA Team.

## 2025-05-28 ENCOUNTER — MYC MEDICAL ADVICE (OUTPATIENT)
Dept: INTERNAL MEDICINE | Facility: CLINIC | Age: 35
End: 2025-05-28
Payer: COMMERCIAL

## 2025-05-28 DIAGNOSIS — Z91.030 HISTORY OF BEE STING ALLERGY: ICD-10-CM

## 2025-05-28 DIAGNOSIS — E66.811 CLASS 1 OBESITY DUE TO EXCESS CALORIES WITHOUT SERIOUS COMORBIDITY WITH BODY MASS INDEX (BMI) OF 33.0 TO 33.9 IN ADULT: Primary | ICD-10-CM

## 2025-05-28 DIAGNOSIS — E66.09 CLASS 1 OBESITY DUE TO EXCESS CALORIES WITHOUT SERIOUS COMORBIDITY WITH BODY MASS INDEX (BMI) OF 33.0 TO 33.9 IN ADULT: Primary | ICD-10-CM

## 2025-05-28 RX ORDER — EPINEPHRINE 0.3 MG/.3ML
0.3 INJECTION SUBCUTANEOUS PRN
Qty: 2 EACH | Refills: 1 | Status: SHIPPED | OUTPATIENT
Start: 2025-05-28

## 2025-06-24 ENCOUNTER — MYC MEDICAL ADVICE (OUTPATIENT)
Dept: INTERNAL MEDICINE | Facility: CLINIC | Age: 35
End: 2025-06-24
Payer: COMMERCIAL

## 2025-06-24 DIAGNOSIS — E66.811 CLASS 1 OBESITY DUE TO EXCESS CALORIES WITHOUT SERIOUS COMORBIDITY WITH BODY MASS INDEX (BMI) OF 33.0 TO 33.9 IN ADULT: ICD-10-CM

## 2025-06-24 DIAGNOSIS — E66.09 CLASS 1 OBESITY DUE TO EXCESS CALORIES WITHOUT SERIOUS COMORBIDITY WITH BODY MASS INDEX (BMI) OF 33.0 TO 33.9 IN ADULT: ICD-10-CM

## 2025-08-12 ENCOUNTER — VIRTUAL VISIT (OUTPATIENT)
Dept: INTERNAL MEDICINE | Facility: CLINIC | Age: 35
End: 2025-08-12
Payer: COMMERCIAL

## 2025-08-12 DIAGNOSIS — E66.811 CLASS 1 OBESITY DUE TO EXCESS CALORIES WITHOUT SERIOUS COMORBIDITY WITH BODY MASS INDEX (BMI) OF 30.0 TO 30.9 IN ADULT: ICD-10-CM

## 2025-08-12 DIAGNOSIS — F33.41 MAJOR DEPRESSIVE DISORDER, RECURRENT, IN PARTIAL REMISSION: ICD-10-CM

## 2025-08-12 DIAGNOSIS — E66.811 CLASS 1 OBESITY DUE TO EXCESS CALORIES WITHOUT SERIOUS COMORBIDITY WITH BODY MASS INDEX (BMI) OF 33.0 TO 33.9 IN ADULT: Primary | ICD-10-CM

## 2025-08-12 DIAGNOSIS — F41.1 GAD (GENERALIZED ANXIETY DISORDER): ICD-10-CM

## 2025-08-12 DIAGNOSIS — E66.09 CLASS 1 OBESITY DUE TO EXCESS CALORIES WITHOUT SERIOUS COMORBIDITY WITH BODY MASS INDEX (BMI) OF 30.0 TO 30.9 IN ADULT: ICD-10-CM

## 2025-08-12 DIAGNOSIS — E66.09 CLASS 1 OBESITY DUE TO EXCESS CALORIES WITHOUT SERIOUS COMORBIDITY WITH BODY MASS INDEX (BMI) OF 33.0 TO 33.9 IN ADULT: Primary | ICD-10-CM

## 2025-08-12 PROCEDURE — 98005 SYNCH AUDIO-VIDEO EST LOW 20: CPT | Performed by: NURSE PRACTITIONER

## 2025-08-12 RX ORDER — PHENTERMINE HYDROCHLORIDE 37.5 MG/1
18.75 TABLET ORAL
Qty: 15 TABLET | Refills: 0 | Status: SHIPPED | OUTPATIENT
Start: 2025-08-12

## 2025-08-12 RX ORDER — BUPROPION HYDROCHLORIDE 300 MG/1
300 TABLET ORAL EVERY MORNING
Qty: 90 TABLET | Refills: 1 | Status: SHIPPED | OUTPATIENT
Start: 2025-08-12

## 2025-08-12 ASSESSMENT — ANXIETY QUESTIONNAIRES
2. NOT BEING ABLE TO STOP OR CONTROL WORRYING: SEVERAL DAYS
GAD7 TOTAL SCORE: 8
3. WORRYING TOO MUCH ABOUT DIFFERENT THINGS: MORE THAN HALF THE DAYS
4. TROUBLE RELAXING: SEVERAL DAYS
GAD7 TOTAL SCORE: 8
GAD7 TOTAL SCORE: 8
IF YOU CHECKED OFF ANY PROBLEMS ON THIS QUESTIONNAIRE, HOW DIFFICULT HAVE THESE PROBLEMS MADE IT FOR YOU TO DO YOUR WORK, TAKE CARE OF THINGS AT HOME, OR GET ALONG WITH OTHER PEOPLE: SOMEWHAT DIFFICULT
7. FEELING AFRAID AS IF SOMETHING AWFUL MIGHT HAPPEN: NOT AT ALL
8. IF YOU CHECKED OFF ANY PROBLEMS, HOW DIFFICULT HAVE THESE MADE IT FOR YOU TO DO YOUR WORK, TAKE CARE OF THINGS AT HOME, OR GET ALONG WITH OTHER PEOPLE?: SOMEWHAT DIFFICULT
6. BECOMING EASILY ANNOYED OR IRRITABLE: SEVERAL DAYS
5. BEING SO RESTLESS THAT IT IS HARD TO SIT STILL: SEVERAL DAYS
1. FEELING NERVOUS, ANXIOUS, OR ON EDGE: MORE THAN HALF THE DAYS
7. FEELING AFRAID AS IF SOMETHING AWFUL MIGHT HAPPEN: NOT AT ALL

## 2025-09-02 ENCOUNTER — MYC MEDICAL ADVICE (OUTPATIENT)
Dept: INTERNAL MEDICINE | Facility: CLINIC | Age: 35
End: 2025-09-02
Payer: COMMERCIAL

## 2025-09-02 DIAGNOSIS — E66.09 CLASS 1 OBESITY DUE TO EXCESS CALORIES WITHOUT SERIOUS COMORBIDITY WITH BODY MASS INDEX (BMI) OF 33.0 TO 33.9 IN ADULT: ICD-10-CM

## 2025-09-02 DIAGNOSIS — E66.811 CLASS 1 OBESITY DUE TO EXCESS CALORIES WITHOUT SERIOUS COMORBIDITY WITH BODY MASS INDEX (BMI) OF 33.0 TO 33.9 IN ADULT: ICD-10-CM

## 2025-09-03 RX ORDER — PHENTERMINE HYDROCHLORIDE 37.5 MG/1
37.5 TABLET ORAL
Qty: 30 TABLET | Refills: 0 | Status: SHIPPED | OUTPATIENT
Start: 2025-09-03